# Patient Record
Sex: FEMALE | Race: WHITE | NOT HISPANIC OR LATINO | URBAN - METROPOLITAN AREA
[De-identification: names, ages, dates, MRNs, and addresses within clinical notes are randomized per-mention and may not be internally consistent; named-entity substitution may affect disease eponyms.]

---

## 2017-02-21 ENCOUNTER — EMERGENCY (EMERGENCY)
Facility: HOSPITAL | Age: 72
LOS: 1 days | Discharge: HOME | End: 2017-02-21
Attending: EMERGENCY MEDICINE
Payer: MEDICARE

## 2017-02-21 VITALS
RESPIRATION RATE: 20 BRPM | TEMPERATURE: 98 F | DIASTOLIC BLOOD PRESSURE: 92 MMHG | WEIGHT: 220.02 LBS | HEART RATE: 92 BPM | OXYGEN SATURATION: 100 % | HEIGHT: 68 IN | SYSTOLIC BLOOD PRESSURE: 142 MMHG

## 2017-02-21 VITALS
TEMPERATURE: 99 F | RESPIRATION RATE: 18 BRPM | HEART RATE: 95 BPM | DIASTOLIC BLOOD PRESSURE: 72 MMHG | OXYGEN SATURATION: 97 % | SYSTOLIC BLOOD PRESSURE: 130 MMHG

## 2017-02-21 DIAGNOSIS — R41.0 DISORIENTATION, UNSPECIFIED: ICD-10-CM

## 2017-02-21 DIAGNOSIS — R41.82 ALTERED MENTAL STATUS, UNSPECIFIED: ICD-10-CM

## 2017-02-21 LAB
ALBUMIN SERPL ELPH-MCNC: 3.7 G/DL — SIGNIFICANT CHANGE UP (ref 3.3–5.2)
ALP SERPL-CCNC: 73 U/L — SIGNIFICANT CHANGE UP (ref 40–120)
ALT FLD-CCNC: 7 U/L — SIGNIFICANT CHANGE UP
AMMONIA BLD-MCNC: 22 UMOL/L — SIGNIFICANT CHANGE UP (ref 11–55)
ANION GAP SERPL CALC-SCNC: 14 MMOL/L — SIGNIFICANT CHANGE UP (ref 5–17)
APPEARANCE UR: CLEAR — SIGNIFICANT CHANGE UP
AST SERPL-CCNC: 12 U/L — SIGNIFICANT CHANGE UP
BASOPHILS # BLD AUTO: 0 K/UL — SIGNIFICANT CHANGE UP (ref 0–0.2)
BASOPHILS NFR BLD AUTO: 0.4 % — SIGNIFICANT CHANGE UP (ref 0–2)
BILIRUB SERPL-MCNC: 0.7 MG/DL — SIGNIFICANT CHANGE UP (ref 0.4–2)
BILIRUB UR-MCNC: NEGATIVE — SIGNIFICANT CHANGE UP
BUN SERPL-MCNC: 17 MG/DL — SIGNIFICANT CHANGE UP (ref 8–20)
CALCIUM SERPL-MCNC: 9.1 MG/DL — SIGNIFICANT CHANGE UP (ref 8.6–10.2)
CHLORIDE SERPL-SCNC: 97 MMOL/L — LOW (ref 98–107)
CO2 SERPL-SCNC: 23 MMOL/L — SIGNIFICANT CHANGE UP (ref 22–29)
COLOR SPEC: SIGNIFICANT CHANGE UP
CREAT SERPL-MCNC: 0.98 MG/DL — SIGNIFICANT CHANGE UP (ref 0.5–1.3)
DIFF PNL FLD: ABNORMAL
EOSINOPHIL # BLD AUTO: 0.1 K/UL — SIGNIFICANT CHANGE UP (ref 0–0.5)
EOSINOPHIL NFR BLD AUTO: 1.3 % — SIGNIFICANT CHANGE UP (ref 0–6)
GLUCOSE SERPL-MCNC: 93 MG/DL — SIGNIFICANT CHANGE UP (ref 70–115)
GLUCOSE UR QL: NEGATIVE MG/DL — SIGNIFICANT CHANGE UP
HCT VFR BLD CALC: 39.3 % — SIGNIFICANT CHANGE UP (ref 37–47)
HGB BLD-MCNC: 12.9 G/DL — SIGNIFICANT CHANGE UP (ref 12–16)
KETONES UR-MCNC: NEGATIVE — SIGNIFICANT CHANGE UP
LACTATE BLDV-MCNC: 1.5 MMOL/L — SIGNIFICANT CHANGE UP (ref 0.5–1.6)
LEUKOCYTE ESTERASE UR-ACNC: ABNORMAL
LIDOCAIN IGE QN: 123 U/L — HIGH (ref 22–51)
LYMPHOCYTES # BLD AUTO: 1.7 K/UL — SIGNIFICANT CHANGE UP (ref 1–4.8)
LYMPHOCYTES # BLD AUTO: 36 % — SIGNIFICANT CHANGE UP (ref 20–55)
MAGNESIUM SERPL-MCNC: 2.4 MG/DL — SIGNIFICANT CHANGE UP (ref 1.8–2.5)
MCHC RBC-ENTMCNC: 28.4 PG — SIGNIFICANT CHANGE UP (ref 27–31)
MCHC RBC-ENTMCNC: 32.8 G/DL — SIGNIFICANT CHANGE UP (ref 32–36)
MCV RBC AUTO: 86.6 FL — SIGNIFICANT CHANGE UP (ref 81–99)
MONOCYTES # BLD AUTO: 0.4 K/UL — SIGNIFICANT CHANGE UP (ref 0–0.8)
MONOCYTES NFR BLD AUTO: 9.5 % — SIGNIFICANT CHANGE UP (ref 3–10)
NEUTROPHILS # BLD AUTO: 2.5 K/UL — SIGNIFICANT CHANGE UP (ref 1.8–8)
NEUTROPHILS NFR BLD AUTO: 52.6 % — SIGNIFICANT CHANGE UP (ref 37–73)
NITRITE UR-MCNC: NEGATIVE — SIGNIFICANT CHANGE UP
PH UR: 7 — SIGNIFICANT CHANGE UP (ref 4.8–8)
PHOSPHATE SERPL-MCNC: 2.7 MG/DL — SIGNIFICANT CHANGE UP (ref 2.4–4.7)
PLATELET # BLD AUTO: 220 K/UL — SIGNIFICANT CHANGE UP (ref 150–400)
POTASSIUM SERPL-MCNC: 4 MMOL/L — SIGNIFICANT CHANGE UP (ref 3.5–5.3)
POTASSIUM SERPL-SCNC: 4 MMOL/L — SIGNIFICANT CHANGE UP (ref 3.5–5.3)
PROT SERPL-MCNC: 7.4 G/DL — SIGNIFICANT CHANGE UP (ref 6.6–8.7)
PROT UR-MCNC: NEGATIVE MG/DL — SIGNIFICANT CHANGE UP
RBC # BLD: 4.54 M/UL — SIGNIFICANT CHANGE UP (ref 4.4–5.2)
RBC # FLD: 15.2 % — SIGNIFICANT CHANGE UP (ref 11–15.6)
SODIUM SERPL-SCNC: 134 MMOL/L — LOW (ref 135–145)
SP GR SPEC: 1 — LOW (ref 1.01–1.02)
UROBILINOGEN FLD QL: NEGATIVE MG/DL — SIGNIFICANT CHANGE UP
WBC # BLD: 4.7 K/UL — LOW (ref 4.8–10.8)
WBC # FLD AUTO: 4.7 K/UL — LOW (ref 4.8–10.8)

## 2017-02-21 PROCEDURE — 70450 CT HEAD/BRAIN W/O DYE: CPT | Mod: 26

## 2017-02-21 PROCEDURE — 93010 ELECTROCARDIOGRAM REPORT: CPT

## 2017-02-21 PROCEDURE — 99284 EMERGENCY DEPT VISIT MOD MDM: CPT

## 2017-02-21 RX ORDER — SODIUM CHLORIDE 9 MG/ML
1000 INJECTION INTRAMUSCULAR; INTRAVENOUS; SUBCUTANEOUS ONCE
Refills: 0 | Status: COMPLETED | OUTPATIENT
Start: 2017-02-21 | End: 2017-02-21

## 2017-02-21 RX ADMIN — SODIUM CHLORIDE 1000 MILLILITER(S): 9 INJECTION INTRAMUSCULAR; INTRAVENOUS; SUBCUTANEOUS at 20:25

## 2017-02-21 NOTE — ED ADULT NURSE REASSESSMENT NOTE - NS ED NURSE REASSESS COMMENT FT1
pt resting comfortably in bed with family at bedside.  IVF infusing without incident.  pt states she Is "feeling much better" and is ready to go home.  vital signs stable as per flowsheet.  will continue to monitor and reassess.

## 2017-02-21 NOTE — ED PROVIDER NOTE - NEUROLOGICAL, MLM
Alert and oriented, no focal deficits, no motor or sensory deficits.  Patient ambulatory with walker.

## 2017-02-21 NOTE — ED PROVIDER NOTE - CONSTITUTIONAL, MLM
normal... Well appearing, well nourished, awake, alert, oriented to person, place, time/situation and in no apparent distress but with confusion.

## 2017-02-21 NOTE — ED ADULT NURSE NOTE - CHIEF COMPLAINT QUOTE
ams per ems dispatch received a call from pt stating that she was being poisoned. pt arrives to er awake confused unable to obtain further info from pt due to confusion. ems states they spoke with pts son which stated pt has dementia and this is not new and the problem is she lives alone.

## 2017-02-21 NOTE — ED ADULT NURSE NOTE - OBJECTIVE STATEMENT
Assumed patient care at 1615.  Pt with Sister who states diagnosed with Klebsiella UTI 5 days ago.  Today Patient called 911 herself because she suddenly became dizzy.  Ambulates with a walker.  Pt is alert to person and place but not to time. She denies pain.  Sister states Pt has been showing signs of forgetfulness for the last three years.  She has becoming more disoriented over the last three weeks.  Pt ambulated with a walker and assistance to ER bathroom for urine sample.  Respirations even and unlabored.  Abdomen soft, nondistended. Assumed patient care at 1615. received in yellow gown.  Pt with Sister who states diagnosed with Klebsiella UTI 5 days ago.  Today Patient called 911 herself because she suddenly became dizzy.  Ambulates with a walker.  Pt is alert to person and place but not to time. She denies pain.  Sister states Pt has been showing signs of forgetfulness for the last three years.  She has becoming more disoriented over the last three weeks.  Pt ambulated with a walker and assistance to ER bathroom for urine sample.  Respirations even and unlabored.  Abdomen soft, nondistended.

## 2017-02-21 NOTE — ED PROVIDER NOTE - OBJECTIVE STATEMENT
This patient is a 72 year old woman with past medical history of anemia and dementia BIBA for dizziness. This patient is a 72 year old woman with past medical history of anemia and dementia BIBA with accusations of her being poisoned. This patient is a 72 year old woman with past medical history of anemia and dementia BIBA with accusations of her being poisoned.  Patient states that her brother who helps take care of her has been giving her medications.  Currently in the ER she has no complaints and cannot remember why she called the ambulance.  Patient's family arrived in the ER.  Her brother who is at bedside states that the patient reported dizziness and vision loss.  She is currently being treated for UTI.  1 week ago she had lab work that showed + UA with Klebsiella Pneumoniae.  She was started on Keflex.  Her brother has been giving her this new medication every day.  Brother and sister at bedside state that the patient has been getting better.  She told them that she felt dizzy and that is why she called the ambulance.  Family feel that patient is acting appropriately in the ER and at her baseline.

## 2017-02-24 LAB
-  AMPICILLIN: SIGNIFICANT CHANGE UP
-  NITROFURANTOIN: SIGNIFICANT CHANGE UP
-  TETRACYCLINE: SIGNIFICANT CHANGE UP
-  VANCOMYCIN: SIGNIFICANT CHANGE UP
CULTURE RESULTS: SIGNIFICANT CHANGE UP
METHOD TYPE: SIGNIFICANT CHANGE UP
ORGANISM # SPEC MICROSCOPIC CNT: SIGNIFICANT CHANGE UP
ORGANISM # SPEC MICROSCOPIC CNT: SIGNIFICANT CHANGE UP
SPECIMEN SOURCE: SIGNIFICANT CHANGE UP

## 2017-02-26 LAB
-  AMPICILLIN: SIGNIFICANT CHANGE UP
ORGANISM # SPEC MICROSCOPIC CNT: SIGNIFICANT CHANGE UP

## 2019-05-07 ENCOUNTER — TRANSCRIPTION ENCOUNTER (OUTPATIENT)
Age: 74
End: 2019-05-07

## 2019-05-08 NOTE — ED PROVIDER NOTE - EYES, MLM
Regular rate and rhythm, Heart sounds S1 S2 present, no murmurs, rubs or gallops
Clear bilaterally, pupils equal, round and reactive to light.

## 2019-07-15 ENCOUNTER — TRANSCRIPTION ENCOUNTER (OUTPATIENT)
Age: 74
End: 2019-07-15

## 2019-07-15 ENCOUNTER — INPATIENT (INPATIENT)
Facility: HOSPITAL | Age: 74
LOS: 2 days | Discharge: HOME | DRG: 69 | End: 2019-07-18
Attending: INTERNAL MEDICINE | Admitting: INTERNAL MEDICINE
Payer: MEDICARE

## 2019-07-15 VITALS
TEMPERATURE: 98 F | RESPIRATION RATE: 18 BRPM | DIASTOLIC BLOOD PRESSURE: 76 MMHG | SYSTOLIC BLOOD PRESSURE: 122 MMHG | OXYGEN SATURATION: 98 % | HEART RATE: 88 BPM

## 2019-07-15 DIAGNOSIS — R53.1 WEAKNESS: ICD-10-CM

## 2019-07-15 LAB
ALBUMIN SERPL ELPH-MCNC: 4 G/DL — SIGNIFICANT CHANGE UP (ref 3.3–5.2)
ALP SERPL-CCNC: 102 U/L — SIGNIFICANT CHANGE UP (ref 40–120)
ALT FLD-CCNC: 20 U/L — SIGNIFICANT CHANGE UP
ANION GAP SERPL CALC-SCNC: 11 MMOL/L — SIGNIFICANT CHANGE UP (ref 5–17)
APTT BLD: 27.3 SEC — LOW (ref 27.5–36.3)
AST SERPL-CCNC: 38 U/L — HIGH
BASOPHILS # BLD AUTO: 0.02 K/UL — SIGNIFICANT CHANGE UP (ref 0–0.2)
BASOPHILS NFR BLD AUTO: 0.3 % — SIGNIFICANT CHANGE UP (ref 0–2)
BILIRUB SERPL-MCNC: 0.6 MG/DL — SIGNIFICANT CHANGE UP (ref 0.4–2)
BUN SERPL-MCNC: 23 MG/DL — HIGH (ref 8–20)
CALCIUM SERPL-MCNC: 10 MG/DL — SIGNIFICANT CHANGE UP (ref 8.6–10.2)
CHLORIDE SERPL-SCNC: 94 MMOL/L — LOW (ref 98–107)
CO2 SERPL-SCNC: 22 MMOL/L — SIGNIFICANT CHANGE UP (ref 22–29)
CREAT SERPL-MCNC: 0.74 MG/DL — SIGNIFICANT CHANGE UP (ref 0.5–1.3)
EOSINOPHIL # BLD AUTO: 0.05 K/UL — SIGNIFICANT CHANGE UP (ref 0–0.5)
EOSINOPHIL NFR BLD AUTO: 0.7 % — SIGNIFICANT CHANGE UP (ref 0–6)
GLUCOSE SERPL-MCNC: 104 MG/DL — SIGNIFICANT CHANGE UP (ref 70–115)
HCT VFR BLD CALC: 36.8 % — SIGNIFICANT CHANGE UP (ref 34.5–45)
HGB BLD-MCNC: 12.1 G/DL — SIGNIFICANT CHANGE UP (ref 11.5–15.5)
IMM GRANULOCYTES NFR BLD AUTO: 0.5 % — SIGNIFICANT CHANGE UP (ref 0–1.5)
INR BLD: 0.98 RATIO — SIGNIFICANT CHANGE UP (ref 0.88–1.16)
LACTATE BLDV-MCNC: 1.3 MMOL/L — SIGNIFICANT CHANGE UP (ref 0.5–2)
LYMPHOCYTES # BLD AUTO: 1.12 K/UL — SIGNIFICANT CHANGE UP (ref 1–3.3)
LYMPHOCYTES # BLD AUTO: 15.2 % — SIGNIFICANT CHANGE UP (ref 13–44)
MCHC RBC-ENTMCNC: 31.1 PG — SIGNIFICANT CHANGE UP (ref 27–34)
MCHC RBC-ENTMCNC: 32.9 GM/DL — SIGNIFICANT CHANGE UP (ref 32–36)
MCV RBC AUTO: 94.6 FL — SIGNIFICANT CHANGE UP (ref 80–100)
MONOCYTES # BLD AUTO: 0.84 K/UL — SIGNIFICANT CHANGE UP (ref 0–0.9)
MONOCYTES NFR BLD AUTO: 11.4 % — SIGNIFICANT CHANGE UP (ref 2–14)
NEUTROPHILS # BLD AUTO: 5.29 K/UL — SIGNIFICANT CHANGE UP (ref 1.8–7.4)
NEUTROPHILS NFR BLD AUTO: 71.9 % — SIGNIFICANT CHANGE UP (ref 43–77)
PLATELET # BLD AUTO: 301 K/UL — SIGNIFICANT CHANGE UP (ref 150–400)
POTASSIUM SERPL-MCNC: 4.3 MMOL/L — SIGNIFICANT CHANGE UP (ref 3.5–5.3)
POTASSIUM SERPL-SCNC: 4.3 MMOL/L — SIGNIFICANT CHANGE UP (ref 3.5–5.3)
PROT SERPL-MCNC: 7.5 G/DL — SIGNIFICANT CHANGE UP (ref 6.6–8.7)
PROTHROM AB SERPL-ACNC: 11.3 SEC — SIGNIFICANT CHANGE UP (ref 10–12.9)
RBC # BLD: 3.89 M/UL — SIGNIFICANT CHANGE UP (ref 3.8–5.2)
RBC # FLD: 13.5 % — SIGNIFICANT CHANGE UP (ref 10.3–14.5)
SODIUM SERPL-SCNC: 127 MMOL/L — LOW (ref 135–145)
TROPONIN T SERPL-MCNC: <0.01 NG/ML — SIGNIFICANT CHANGE UP (ref 0–0.06)
WBC # BLD: 7.36 K/UL — SIGNIFICANT CHANGE UP (ref 3.8–10.5)
WBC # FLD AUTO: 7.36 K/UL — SIGNIFICANT CHANGE UP (ref 3.8–10.5)

## 2019-07-15 PROCEDURE — 99285 EMERGENCY DEPT VISIT HI MDM: CPT

## 2019-07-15 PROCEDURE — 71045 X-RAY EXAM CHEST 1 VIEW: CPT | Mod: 26

## 2019-07-15 PROCEDURE — 93010 ELECTROCARDIOGRAM REPORT: CPT

## 2019-07-15 PROCEDURE — 99223 1ST HOSP IP/OBS HIGH 75: CPT | Mod: AI

## 2019-07-15 PROCEDURE — 99222 1ST HOSP IP/OBS MODERATE 55: CPT

## 2019-07-15 RX ORDER — ATORVASTATIN CALCIUM 80 MG/1
40 TABLET, FILM COATED ORAL AT BEDTIME
Refills: 0 | Status: DISCONTINUED | OUTPATIENT
Start: 2019-07-15 | End: 2019-07-18

## 2019-07-15 RX ORDER — SODIUM CHLORIDE 9 MG/ML
1000 INJECTION INTRAMUSCULAR; INTRAVENOUS; SUBCUTANEOUS
Refills: 0 | Status: DISCONTINUED | OUTPATIENT
Start: 2019-07-15 | End: 2019-07-16

## 2019-07-15 RX ORDER — SODIUM CHLORIDE 9 MG/ML
1000 INJECTION INTRAMUSCULAR; INTRAVENOUS; SUBCUTANEOUS ONCE
Refills: 0 | Status: COMPLETED | OUTPATIENT
Start: 2019-07-15 | End: 2019-07-15

## 2019-07-15 RX ORDER — HEPARIN SODIUM 5000 [USP'U]/ML
5000 INJECTION INTRAVENOUS; SUBCUTANEOUS EVERY 8 HOURS
Refills: 0 | Status: DISCONTINUED | OUTPATIENT
Start: 2019-07-15 | End: 2019-07-18

## 2019-07-15 RX ORDER — HALOPERIDOL DECANOATE 100 MG/ML
2 INJECTION INTRAMUSCULAR EVERY 6 HOURS
Refills: 0 | Status: DISCONTINUED | OUTPATIENT
Start: 2019-07-15 | End: 2019-07-18

## 2019-07-15 RX ORDER — ACETAMINOPHEN 500 MG
650 TABLET ORAL EVERY 6 HOURS
Refills: 0 | Status: DISCONTINUED | OUTPATIENT
Start: 2019-07-15 | End: 2019-07-18

## 2019-07-15 RX ORDER — ASPIRIN/CALCIUM CARB/MAGNESIUM 324 MG
81 TABLET ORAL DAILY
Refills: 0 | Status: DISCONTINUED | OUTPATIENT
Start: 2019-07-15 | End: 2019-07-18

## 2019-07-15 RX ADMIN — ATORVASTATIN CALCIUM 40 MILLIGRAM(S): 80 TABLET, FILM COATED ORAL at 23:22

## 2019-07-15 RX ADMIN — Medication 81 MILLIGRAM(S): at 23:23

## 2019-07-15 RX ADMIN — SODIUM CHLORIDE 75 MILLILITER(S): 9 INJECTION INTRAMUSCULAR; INTRAVENOUS; SUBCUTANEOUS at 18:29

## 2019-07-15 RX ADMIN — HALOPERIDOL DECANOATE 2 MILLIGRAM(S): 100 INJECTION INTRAMUSCULAR at 23:22

## 2019-07-15 RX ADMIN — HEPARIN SODIUM 5000 UNIT(S): 5000 INJECTION INTRAVENOUS; SUBCUTANEOUS at 23:22

## 2019-07-15 RX ADMIN — SODIUM CHLORIDE 1000 MILLILITER(S): 9 INJECTION INTRAMUSCULAR; INTRAVENOUS; SUBCUTANEOUS at 15:07

## 2019-07-15 RX ADMIN — SODIUM CHLORIDE 1000 MILLILITER(S): 9 INJECTION INTRAMUSCULAR; INTRAVENOUS; SUBCUTANEOUS at 18:28

## 2019-07-15 NOTE — ED ADULT NURSE REASSESSMENT NOTE - NS ED NURSE REASSESS COMMENT FT1
Incontinence care performed, patient had regular bowel movement. Patient agitated during care, otherwise remains calm. Remains confused to baseline. IVF in progress, well tolerated. Will continue to monitor.

## 2019-07-15 NOTE — ED ADULT NURSE REASSESSMENT NOTE - NS ED NURSE REASSESS COMMENT FT1
Incontinence care provided. Patient becomes agitated & yells during care. Noted to have incontinence associated dermatitis.

## 2019-07-15 NOTE — H&P ADULT - HISTORY OF PRESENT ILLNESS
73 yo F w/ hx dementia, peripheral edema presents to ER for left sided weakness.  patient is a poor historian and dementia precludes relevant information to be obtained    per sister at bedside, patient was last normal 8 am and during breakfast began to tilt to right right with resulting difficulty in ambulating without assistance  +diarrhea x 2-3 weeks but resolved post course of antibiotics prescribed by PMD.  at baseline, patient ambulates with walker but requires ADL assistance.    evaluated in ER and deemed not candidate for TPA

## 2019-07-15 NOTE — ED PROVIDER NOTE - CLINICAL SUMMARY MEDICAL DECISION MAKING FREE TEXT BOX
patient presented with left sided weakness, found to have hypodensity of left temporal lobe, cta with aneurysm of ascending aorta will consult ct-surg, dr. mckinney consulted as neuro. admit to medicine and family requesting hospice consult. hcp desires patient to be dnr/dni.

## 2019-07-15 NOTE — ED ADULT NURSE REASSESSMENT NOTE - NS ED NURSE REASSESS COMMENT FT1
Assumed patient care at 1408, report received from previous RN, charting as noted. Patient awake, alert to self. Brother & sister at bedside stated patient has advanced dementia, denies any other medical Hx. Family has patients dentures with them. Cardiac monitor in place. Patient has saline lock in place, patent, negative s/s phlebitis or infiltration. Plan of care discussed in detail with brother, who is HCP. HCP expressed wishes for advanced directives, MD made aware. Will monitor.

## 2019-07-15 NOTE — ED PROVIDER NOTE - OBJECTIVE STATEMENT
74yoF; with pmh signif for Dementia, Peripheral Edema; now p/w left sided weakness. patient last seen normal at 9am.  aide states she woke up in her normal state of health at 8am. ambulated with walker without difficulty. went to sit down at breakfast table a little after 9am and leaned over to right sided. upon arrival to ED patient with slight drift of L UE. baseline confusion 2/2 dementia. patient with diarrhea x1 month--nonbloody. denies n/v. denies abd pain. denies cp/sob/palp.  denies dysuria, hematuria, frequency, urgency.  PMH: Dementia, Peripheral Edema  SOCIAL:  No tobacco/illicit substance use/EtOH

## 2019-07-15 NOTE — ED ADULT NURSE NOTE - INTERVENTIONS DEFINITIONS
Stretcher in lowest position, wheels locked, appropriate side rails in place/Provide visual cue, wrist band, yellow gown, etc.

## 2019-07-15 NOTE — H&P ADULT - ASSESSMENT
75 yo F w/ hx dementia, peripheral edema presents to ER for left sided weakness.    left sided weakness: r/o CVA, r/o UTI    repeat CT in am    aspirin/statin    lipid profile, tsh, hga1c     patient will not tolerate MRI (sister agrees)    hyponatremia: likely due to dehydration from diarrhea    IVF, trend      ascending aortic aneurysm on CT 4.4cm:    no intervention after d/w CT surgery and family.    dementia: at baseline.    diarrhea: check GI PCR    goals of care: DNR/DNI signed by ER and HCP son    family requesting hospice evaluation for home support and conservative management with patient.

## 2019-07-15 NOTE — ED ADULT TRIAGE NOTE - CHIEF COMPLAINT QUOTE
Pt brought in by ambulance from home for eval of generalized weakness since approx 9am. Pt with left sided lilting to the side. Code stroke called.

## 2019-07-15 NOTE — CONSULT NOTE ADULT - SUBJECTIVE AND OBJECTIVE BOX
74 year old female who is a poor historian, with a PMH of dementia & no other significant surgical history, information obtained from sister at the bedside.  Pt. admitted to ER for left sided weakness with right neglect, & difficulty in ambulating without assistance. Pt. has 24 hour nursing assistant who assists with ADL's. CT Angio Neck from 7/15/19 indicated aneurysmal ascending aorta measuring 4.4 cm, CTS notified for evaluation.      PAST MEDICAL & SURGICAL HISTORY:  Advanced dementia  No significant past surgical history      REVIEW OF SYSTEMS : Unable to obtain history, patient with dementia      General: No Weight change/ Fatigue/ HA/Dizzy	    Skin/Breast: No Rashes/ Lesions/ Masses  	  Ophthalmologic: No Blurry vision/ Glaucoma/ Blindness  	  ENT: No Hearing loss/ Drainage/ Lesions	    Respiratory and Thorax: No Cough/ Wheezing/ SOB/ Hemoptysis/ Sputum production  	  Cardiovascular: No Chest pain/ Palpitations/ Diaphoresis	    Gastrointestinal: No Nausea/ Vomiting/ Constipation/ Appetite Change	    Genitourinary: No Heamturia/ Dysuria/ Frequency change/ Impotence	    Musculoskeletal: No Pain/ Weakness/ Claudication	    Neurological: No Seizures/ TIA/CVA/ Parastesias	    Psychiatric: No Dementia/ Depression/ SI/HI	    Hematology/Lymphatics: No hx of bleeding/ Edema	    Endocrine:	No Hyperglycemia/ Hypoglycemia    Allergic/Immunologic:	 No Anaphylaxis/ Intolerance/ Recent illnesses    MEDICATIONS  (STANDING):  aspirin enteric coated 81 milliGRAM(s) Oral daily  atorvastatin 40 milliGRAM(s) Oral at bedtime  heparin  Injectable 5000 Unit(s) SubCutaneous every 8 hours  sodium chloride 0.9%. 1000 milliLiter(s) (75 mL/Hr) IV Continuous <Continuous>    MEDICATIONS  (PRN):  acetaminophen   Tablet .. 650 milliGRAM(s) Oral every 6 hours PRN Temp greater or equal to 38C (100.4F), Mild Pain (1 - 3), Moderate Pain (4 - 6)  haloperidol    Injectable 2 milliGRAM(s) IntraMuscular every 6 hours PRN Agitation      Allergies    No Known Allergies    Intolerances        SOCIAL HISTORY:    FAMILY HISTORY:  FH: hypertension      Vital Signs Last 24 Hrs  T(C): 36.9 (15 Jul 2019 12:59), Max: 36.9 (15 Jul 2019 12:30)  T(F): 98.4 (15 Jul 2019 12:59), Max: 98.4 (15 Jul 2019 12:30)  HR: 90 (15 Jul 2019 12:59) (88 - 90)  BP: 132/84 (15 Jul 2019 12:59) (122/76 - 132/84)  BP(mean): --  RR: 16 (15 Jul 2019 12:59) (16 - 18)  SpO2: 99% (15 Jul 2019 12:59) (98% - 99%)    General: WN/WD NAD  Neurology: Awake, nonfocal, FLORES spontaneously   Eyes: Scleras clear, PERRLA/ EOMI, Gross vision intact  ENT: Gross hearing intact, grossly patent pharynx, no stridor  Neck: Neck supple, trachea midline, No JVD,   Respiratory: CTA B/L, No wheezing, rales, rhonchi  CV: RRR, S1S2, no murmurs, rubs or gallops  Abdominal: Soft, NT, ND +BS,   Extremities: +3 edema to bilateral lower extremities, + peripheral pulses  Skin: Lypoma noted to left posterior back, No Rashes, Hematoma, Ecchymosis  Lymphatic: No Neck, axilla, groin LAD  Psych: Oriented to name      LABS:                        12.1   7.36  )-----------( 301      ( 15 Jul 2019 12:57 )             36.8     07-15    127<L>  |  94<L>  |  23.0<H>  ----------------------------<  104  4.3   |  22.0  |  0.74    Ca    10.0      15 Jul 2019 12:57    TPro  7.5  /  Alb  4.0  /  TBili  0.6  /  DBili  x   /  AST  38<H>  /  ALT  20  /  AlkPhos  102  07-15    PT/INR - ( 15 Jul 2019 12:57 )   PT: 11.3 sec;   INR: 0.98 ratio         PTT - ( 15 Jul 2019 12:57 )  PTT:27.3 sec      RADIOLOGY & ADDITIONAL STUDIES:  < from: CT Angio Neck w/ IV Cont (07.15.19 @ 13:31) >   EXAM:  CT ANGIO NECK (W)AW IC                         EXAM:  CT ANGIO BRAIN (W)AW IC                          PROCEDURE DATE:  07/15/2019          INTERPRETATION:  EXAMS:  1.  CT ANGIOGRAPHY NECK WITH INTRAVENOUS CONTRAST.  2.  CT ANGIOGRAPHY BRAIN WITH INTRAVENOUS CONTRAST.    CLINICAL HISTORY: left sided weakness. . .     TECHNIQUE: Contrast enhanced axial CT images were acquired from the   aortic arch to the vertex of the calvarium, during the angiographic   phase.  Three-dimensional maximum intensity projection reformats were   generated.  92 ml of Omnipaque-350 mg/ml were administered intravenously,   without immediate complication.    COMPARISON STUDY: CT head 7/15/2019    FINDINGS:     CT ANGIOGRAPHY NECK:     Thoracic aorta and branch vessels: Patent. Aneurysmal ascending aorta   measuring 4.4 cm.    Right carotid system: Patent.  No hemodynamically significant stenosis   using NASCET criteria.  No evidence of dissection.    Left carotid system: Patent.  No hemodynamically significant stenosis   using NASCET criteria.  No evidence of dissection.    Vertebral arteries: No focal stenosis or dissection.     Soft tissues of the neck: Unremarkable.    Visualized spine: Degenerative changes.  Visualized upper chest: No focal consolidation.    CT ANGIOGRAPHY BRAIN:    Internal carotid arteries: Patent.   Anterior cerebral arteries: Patent.   Middle cerebral arteries: Patent. Moderate narrowing of a proximal left   M2 branch (8:38, 7:67).  Posterior cerebral arteries: Patent.  Vertebrobasilar: Patent.  Branch vasculature of the posterior circulation   is within normal limits.     Vascular lesions: No evidence of intracranial aneurysm or large vascular   malformation, within limits of CT technique.    IMPRESSION:   CT angiography neck:   1.  No hemodynamically significant stenosis of the bilateral cervical   ICAs using NASCET criteria.  Patent vertebral arteries.  No evidence of   vascular dissection.  2.  Aneurysmal ascending aorta measuring 4.4 cm.  CT angiographybrain:   1.  No large vessel occlusion. Moderate narrowing of a proximal left M2   branch.  2.  No evidence of aneurysm.    [place]   This document has been electronically signed. Jul 15 2019  1:40PM    EXAM:  CT BRAIN STROKE PROTOCOL                          PROCEDURE DATE:  07/15/2019          INTERPRETATION:  CLINICAL INFORMATION: left sided weakness. . .    TECHNIQUE: Sequential axial images were obtained from the vertex to the   skull base without intravenous contrast. Coronal and sagittal   reformations were obtained.     COMPARISON: None .    FINDINGS:    Images are degraded by motion.    There is no acute intracranial hemorrhage or mass effect. There are areas   of hypodensity in the bilateral hemispheric white matter suggesting   chronic white matter microvascular ischemic change. There is an area of   hypodensity in the left posterior temporal white matter which may   represent asymmetric white matter changes versus acute infarct. There is   cerebral volume loss.    There is no extraaxial fluid collection.     There is no displaced calvarial fracture. The visualized orbits are   within normal limits. The visualized portions of the paranasal sinuses   are well aerated. The mastoid air cells are well aerated.    IMPRESSION: An area of hypodensity in the left posterior temporal white   matter which may represent asymmetric white matter changes versus acute   infarct.    These findings were discussed with Dr. CELESTE HALL [phone] at   7/15/2019 12:51 PM by Dr. [place] with read back confirmation.      SOU[place]O   This document has been electronically signed. Jul 15 2019 12:54PM    < end of copied text >                ASSESSMENT:   74yFemalePAST MEDICAL & SURGICAL HISTORY:  Advanced dementia  No significant past surgical history  HEALTH ISSUES - PROBLEM Dx:      HEALTH ISSUES - R/O PROBLEM Dx:   CT noted aneurysmal ascending aorta measuring 4.4 cm    PLAN:  Dr. Yost to review CT images, no surgical intervention at this time.   CTS to follow patient

## 2019-07-15 NOTE — ED PROVIDER NOTE - PHYSICAL EXAMINATION
Gen: Alert, NAD  Head: NC, AT, PERRL, EOMI, normal lids/conjunctiva  Neck: +supple, no tenderness/meningismus/JVD, +Trachea midline  Pulm: Bilateral BS, normal resp effort, no wheeze/stridor/retractions  CV: RRR, no M/R/G, +dist pulses  Abd: soft, NT/ND, +BS, no hepatosplenomegaly  Mskel: 2+ pedal edema bilaterally  Skin: no rash  Neuro: see below

## 2019-07-15 NOTE — ED PROVIDER NOTE - PROGRESS NOTE DETAILS
benefits of iv contrast outweigh risks at this time. Dr. Apple (Neurology) recommends against TPA given low NIHSS, rapidly improving symptoms, family/HCP agrees against giving TPA.

## 2019-07-16 PROBLEM — Z00.00 ENCOUNTER FOR PREVENTIVE HEALTH EXAMINATION: Status: ACTIVE | Noted: 2019-07-16

## 2019-07-16 LAB
ANION GAP SERPL CALC-SCNC: 9 MMOL/L — SIGNIFICANT CHANGE UP (ref 5–17)
APPEARANCE UR: CLEAR — SIGNIFICANT CHANGE UP
BACTERIA # UR AUTO: ABNORMAL
BILIRUB UR-MCNC: NEGATIVE — SIGNIFICANT CHANGE UP
BUN SERPL-MCNC: 14 MG/DL — SIGNIFICANT CHANGE UP (ref 8–20)
CALCIUM SERPL-MCNC: 9.3 MG/DL — SIGNIFICANT CHANGE UP (ref 8.6–10.2)
CHLORIDE SERPL-SCNC: 107 MMOL/L — SIGNIFICANT CHANGE UP (ref 98–107)
CHOLEST SERPL-MCNC: 147 MG/DL — SIGNIFICANT CHANGE UP (ref 110–199)
CO2 SERPL-SCNC: 21 MMOL/L — LOW (ref 22–29)
COLOR SPEC: YELLOW — SIGNIFICANT CHANGE UP
CREAT SERPL-MCNC: 0.62 MG/DL — SIGNIFICANT CHANGE UP (ref 0.5–1.3)
CULTURE RESULTS: SIGNIFICANT CHANGE UP
DIFF PNL FLD: ABNORMAL
EPI CELLS # UR: SIGNIFICANT CHANGE UP
GLUCOSE SERPL-MCNC: 91 MG/DL — SIGNIFICANT CHANGE UP (ref 70–115)
GLUCOSE UR QL: NEGATIVE MG/DL — SIGNIFICANT CHANGE UP
HBA1C BLD-MCNC: 4.8 % — SIGNIFICANT CHANGE UP (ref 4–5.6)
HCT VFR BLD CALC: 33.6 % — LOW (ref 34.5–45)
HDLC SERPL-MCNC: 56 MG/DL — SIGNIFICANT CHANGE UP
HGB BLD-MCNC: 10.5 G/DL — LOW (ref 11.5–15.5)
KETONES UR-MCNC: NEGATIVE — SIGNIFICANT CHANGE UP
LEUKOCYTE ESTERASE UR-ACNC: NEGATIVE — SIGNIFICANT CHANGE UP
LIPID PNL WITH DIRECT LDL SERPL: 81 MG/DL — SIGNIFICANT CHANGE UP
MCHC RBC-ENTMCNC: 30.6 PG — SIGNIFICANT CHANGE UP (ref 27–34)
MCHC RBC-ENTMCNC: 31.3 GM/DL — LOW (ref 32–36)
MCV RBC AUTO: 98 FL — SIGNIFICANT CHANGE UP (ref 80–100)
NITRITE UR-MCNC: POSITIVE
NT-PROBNP SERPL-SCNC: 240 PG/ML — SIGNIFICANT CHANGE UP (ref 0–300)
OSMOLALITY UR: 222 MOSM/KG — LOW (ref 300–1000)
PH UR: 6 — SIGNIFICANT CHANGE UP (ref 5–8)
PLATELET # BLD AUTO: 224 K/UL — SIGNIFICANT CHANGE UP (ref 150–400)
POTASSIUM SERPL-MCNC: 4 MMOL/L — SIGNIFICANT CHANGE UP (ref 3.5–5.3)
POTASSIUM SERPL-SCNC: 4 MMOL/L — SIGNIFICANT CHANGE UP (ref 3.5–5.3)
PROT UR-MCNC: NEGATIVE MG/DL — SIGNIFICANT CHANGE UP
RBC # BLD: 3.43 M/UL — LOW (ref 3.8–5.2)
RBC # FLD: 13.8 % — SIGNIFICANT CHANGE UP (ref 10.3–14.5)
RBC CASTS # UR COMP ASSIST: SIGNIFICANT CHANGE UP /HPF (ref 0–4)
SODIUM SERPL-SCNC: 137 MMOL/L — SIGNIFICANT CHANGE UP (ref 135–145)
SODIUM UR-SCNC: 38 MMOL/L — SIGNIFICANT CHANGE UP
SP GR SPEC: 1.01 — SIGNIFICANT CHANGE UP (ref 1.01–1.02)
SPECIMEN SOURCE: SIGNIFICANT CHANGE UP
TOTAL CHOLESTEROL/HDL RATIO MEASUREMENT: 3 RATIO — LOW (ref 3.3–7.1)
TRIGL SERPL-MCNC: 50 MG/DL — SIGNIFICANT CHANGE UP (ref 10–200)
TSH SERPL-MCNC: 2.63 UIU/ML — SIGNIFICANT CHANGE UP (ref 0.27–4.2)
UROBILINOGEN FLD QL: NEGATIVE MG/DL — SIGNIFICANT CHANGE UP
WBC # BLD: 2.94 K/UL — LOW (ref 3.8–10.5)
WBC # FLD AUTO: 2.94 K/UL — LOW (ref 3.8–10.5)
WBC UR QL: NEGATIVE — SIGNIFICANT CHANGE UP

## 2019-07-16 PROCEDURE — 99232 SBSQ HOSP IP/OBS MODERATE 35: CPT

## 2019-07-16 PROCEDURE — 70450 CT HEAD/BRAIN W/O DYE: CPT | Mod: 26

## 2019-07-16 RX ORDER — NYSTATIN CREAM 100000 [USP'U]/G
1 CREAM TOPICAL
Refills: 0 | Status: DISCONTINUED | OUTPATIENT
Start: 2019-07-16 | End: 2019-07-18

## 2019-07-16 RX ADMIN — HEPARIN SODIUM 5000 UNIT(S): 5000 INJECTION INTRAVENOUS; SUBCUTANEOUS at 15:43

## 2019-07-16 RX ADMIN — HEPARIN SODIUM 5000 UNIT(S): 5000 INJECTION INTRAVENOUS; SUBCUTANEOUS at 05:41

## 2019-07-16 RX ADMIN — Medication 81 MILLIGRAM(S): at 11:56

## 2019-07-16 RX ADMIN — ATORVASTATIN CALCIUM 40 MILLIGRAM(S): 80 TABLET, FILM COATED ORAL at 22:24

## 2019-07-16 RX ADMIN — NYSTATIN CREAM 1 APPLICATION(S): 100000 CREAM TOPICAL at 17:35

## 2019-07-16 RX ADMIN — HEPARIN SODIUM 5000 UNIT(S): 5000 INJECTION INTRAVENOUS; SUBCUTANEOUS at 22:24

## 2019-07-16 NOTE — GOALS OF CARE CONVERSATION - PERSONAL ADVANCE DIRECTIVE - CONVERSATION DETAILS
[facility] RN met with patient, patient's brother Warren and patient's sister Mona at patient bedside. Family requesting home hospice services through [facility]. All aspects of home hospice service explained, including RN, SW, chaplaincy, DME and medication. As per family, patient already has 24/7 private HHA in place through Medicaid. Patient will require a hospital bed with gel mattress to be delivered to home; order for same placed this afternoon. All questions answered; emotional support provided. Consent forms for hospice signed. Will continue to follow.     Ember Ferrara, AMINATA  [phone]

## 2019-07-16 NOTE — DISCHARGE NOTE NURSING/CASE MANAGEMENT/SOCIAL WORK - NSDCDPATPORTLINK_GEN_ALL_CORE
You can access the [facility], offered by [facility], by registering with the following website: [email or web address]

## 2019-07-16 NOTE — PHYSICAL THERAPY INITIAL EVALUATION ADULT - IMPAIRMENTS FOUND, PT EVAL
aerobic capacity/endurance/neuromotor development and sensory integration/muscle strength/gait, locomotion, and balance/cognitive impairment

## 2019-07-16 NOTE — PHYSICAL THERAPY INITIAL EVALUATION ADULT - CRITERIA FOR SKILLED THERAPEUTIC INTERVENTIONS
risk reduction/prevention/rehab potential/therapy frequency/anticipated discharge recommendation/impairments found/functional limitations in following categories

## 2019-07-16 NOTE — DISCHARGE NOTE NURSING/CASE MANAGEMENT/SOCIAL WORK - NSDCPEPTSTRK_GEN_ALL_CORE
Call 911 for stroke/Need for follow up after discharge/Risk factors for stroke/Stroke support groups for patients, families, and friends/Prescribed medications/Stroke education booklet/Stroke warning signs and symptoms/Signs and symptoms of stroke

## 2019-07-16 NOTE — PHYSICAL THERAPY INITIAL EVALUATION ADULT - ADDITIONAL COMMENTS
as per RN, and CCC pt. lives with Family 24 hr supervision, ambulatory prior to admission, due to dementia pt. unable to provide information

## 2019-07-17 LAB
ANION GAP SERPL CALC-SCNC: 11 MMOL/L — SIGNIFICANT CHANGE UP (ref 5–17)
BUN SERPL-MCNC: 14 MG/DL — SIGNIFICANT CHANGE UP (ref 8–20)
CALCIUM SERPL-MCNC: 9.1 MG/DL — SIGNIFICANT CHANGE UP (ref 8.6–10.2)
CHLORIDE SERPL-SCNC: 106 MMOL/L — SIGNIFICANT CHANGE UP (ref 98–107)
CO2 SERPL-SCNC: 23 MMOL/L — SIGNIFICANT CHANGE UP (ref 22–29)
CREAT SERPL-MCNC: 0.58 MG/DL — SIGNIFICANT CHANGE UP (ref 0.5–1.3)
GLUCOSE SERPL-MCNC: 100 MG/DL — SIGNIFICANT CHANGE UP (ref 70–115)
HCT VFR BLD CALC: 36.6 % — SIGNIFICANT CHANGE UP (ref 34.5–45)
HCV AB S/CO SERPL IA: 0.09 S/CO — SIGNIFICANT CHANGE UP (ref 0–0.99)
HCV AB SERPL-IMP: SIGNIFICANT CHANGE UP
HGB BLD-MCNC: 11.7 G/DL — SIGNIFICANT CHANGE UP (ref 11.5–15.5)
MCHC RBC-ENTMCNC: 30.3 PG — SIGNIFICANT CHANGE UP (ref 27–34)
MCHC RBC-ENTMCNC: 32 GM/DL — SIGNIFICANT CHANGE UP (ref 32–36)
MCV RBC AUTO: 94.8 FL — SIGNIFICANT CHANGE UP (ref 80–100)
PLATELET # BLD AUTO: 297 K/UL — SIGNIFICANT CHANGE UP (ref 150–400)
POTASSIUM SERPL-MCNC: 3.7 MMOL/L — SIGNIFICANT CHANGE UP (ref 3.5–5.3)
POTASSIUM SERPL-SCNC: 3.7 MMOL/L — SIGNIFICANT CHANGE UP (ref 3.5–5.3)
RBC # BLD: 3.86 M/UL — SIGNIFICANT CHANGE UP (ref 3.8–5.2)
RBC # FLD: 13.9 % — SIGNIFICANT CHANGE UP (ref 10.3–14.5)
SODIUM SERPL-SCNC: 140 MMOL/L — SIGNIFICANT CHANGE UP (ref 135–145)
WBC # BLD: 3.47 K/UL — LOW (ref 3.8–10.5)
WBC # FLD AUTO: 3.47 K/UL — LOW (ref 3.8–10.5)

## 2019-07-17 PROCEDURE — 99222 1ST HOSP IP/OBS MODERATE 55: CPT

## 2019-07-17 PROCEDURE — 99232 SBSQ HOSP IP/OBS MODERATE 35: CPT

## 2019-07-17 RX ORDER — CEFTRIAXONE 500 MG/1
INJECTION, POWDER, FOR SOLUTION INTRAMUSCULAR; INTRAVENOUS
Refills: 0 | Status: DISCONTINUED | OUTPATIENT
Start: 2019-07-17 | End: 2019-07-18

## 2019-07-17 RX ORDER — CEFTRIAXONE 500 MG/1
1000 INJECTION, POWDER, FOR SOLUTION INTRAMUSCULAR; INTRAVENOUS EVERY 24 HOURS
Refills: 0 | Status: DISCONTINUED | OUTPATIENT
Start: 2019-07-18 | End: 2019-07-18

## 2019-07-17 RX ORDER — CEFTRIAXONE 500 MG/1
1000 INJECTION, POWDER, FOR SOLUTION INTRAMUSCULAR; INTRAVENOUS ONCE
Refills: 0 | Status: COMPLETED | OUTPATIENT
Start: 2019-07-17 | End: 2019-07-17

## 2019-07-17 RX ADMIN — CEFTRIAXONE 100 MILLIGRAM(S): 500 INJECTION, POWDER, FOR SOLUTION INTRAMUSCULAR; INTRAVENOUS at 14:25

## 2019-07-17 RX ADMIN — Medication 81 MILLIGRAM(S): at 11:42

## 2019-07-17 RX ADMIN — ATORVASTATIN CALCIUM 40 MILLIGRAM(S): 80 TABLET, FILM COATED ORAL at 22:48

## 2019-07-17 RX ADMIN — HEPARIN SODIUM 5000 UNIT(S): 5000 INJECTION INTRAVENOUS; SUBCUTANEOUS at 06:11

## 2019-07-17 RX ADMIN — HEPARIN SODIUM 5000 UNIT(S): 5000 INJECTION INTRAVENOUS; SUBCUTANEOUS at 22:48

## 2019-07-17 RX ADMIN — NYSTATIN CREAM 1 APPLICATION(S): 100000 CREAM TOPICAL at 06:11

## 2019-07-17 RX ADMIN — NYSTATIN CREAM 1 APPLICATION(S): 100000 CREAM TOPICAL at 17:52

## 2019-07-17 RX ADMIN — HEPARIN SODIUM 5000 UNIT(S): 5000 INJECTION INTRAVENOUS; SUBCUTANEOUS at 13:15

## 2019-07-17 NOTE — CONSULT NOTE ADULT - SUBJECTIVE AND OBJECTIVE BOX
[facility]  INFECTIOUS DISEASES AND INTERNAL MEDICINE at [place]  =======================================================  Demond Smith MD  Diplomates American Board of Internal Medicine and Infectious Diseases  Telephone [phone]  Fax            [phone]  =======================================================    [facility]-[id number]  YESSI KWAN   HPI:  73 yo F w/ hx dementia, peripheral edema presents to ER for left sided weakness.  patient is a poor historian and dementia precludes relevant information to be obtained    per sister at bedside, patient was last normal 8 am and during breakfast began to tilt to right right with resulting difficulty in ambulating without assistance  +diarrhea x 2-3 weeks but resolved post course of antibiotics prescribed by PMD.  at baseline, patient ambulates with walker but requires ADL assistance.    evaluated in ER and deemed not candidate for TPA.    During workup in hospital. patient found with positive UA for Nitrite and  negative Leukocyte esterase, moderate bacteremia    Urine culture now with more than 100K CFU gram negative rods.     patient denies pain.  cannot provide any additional history.     =======================================================  Past Medical & Surgical Hx:  =====================  PAST MEDICAL & SURGICAL HISTORY:  Advanced dementia  No significant past surgical history    Problem List:  ==========  HEALTH ISSUES - PROBLEM Dx:    Social Hx:  =======  no toxic habits currently    FAMILY HISTORY:  FH: hypertension  no significant family history of immunosuppressive disorders in mother or father   =======================================================  REVIEW OF SYSTEMS:  as above  all other ROS negative  =======================================================  Allergies  No Known Allergies    Antibiotics:  cefTRIAXone   IVPB        Other medications:  aspirin enteric coated 81 milliGRAM(s) Oral daily  atorvastatin 40 milliGRAM(s) Oral at bedtime  heparin  Injectable 5000 Unit(s) SubCutaneous every 8 hours  nystatin Powder 1 Application(s) Topical two times a day     cefTRIAXone   IVPB   100 mL/Hr IV Intermittent (19 @ 14:25)    ======================================================  Physical Exam:  ============  T(F): 97.9 (2019 08:00), Max: 99.3 (2019 18:54)  HR: 81 (2019 08:00)  BP: 125/77 (2019 08:00)  RR: 18 (2019 08:00)  SpO2: 97% (2019 08:00) (96% - 100%)  temp max in last 48H T(F): , Max: 99.3 (19 @ 18:54)    General:  No acute distress.  Eye: Pupils are equal, round and reactive to light, Extraocular movements are intact, Normal conjunctiva.  HENT: Normocephalic, Oral mucosa is moist, No pharyngeal erythema, No sinus tenderness.  Neck: Supple, No lymphadenopathy.  Respiratory: Lungs are clear to auscultation, Respirations are non-labored.  Cardiovascular: Normal rate, Regular rhythm,   Gastrointestinal: Soft, Non-tender, Non-distended, Normal bowel sounds.  Genitourinary: No costovertebral angle tenderness. no suprapubic tenderness  Lymphatics: No lymphadenopathy neck,   Musculoskeletal: Normal range of motion, Normal strength.  Integumentary: No rash.  Neurologic: awake, Cranial Nerves II-XII are grossly intact.  Psychiatric: Appropriate      =======================================================  Labs:                        11.7   3.47  )-----------( 297      ( 2019 08:23 )             36.6       WBC Count: 3.47 K/uL (19 @ 08:23)  WBC Count: 2.94 K/uL (19 @ 05:38)  WBC Count: 7.36 K/uL (07-15-19 @ 12:57)          140  |  106  |  14.0  ----------------------------<  100  3.7   |  23.0  |  0.58    Ca    9.1      2019 08:23      GI PCR Panel, Stool (collected 19 @ 18:11)  Source: .Stool  Final Report (19 @ 21:26):    Enteropathogenic E. coli (EPEC)    DETECTED by PCR    *******Please Note:*******    GI panel PCR evaluates for:    Campylobacter, Plesiomonas shigelloides, Salmonella,    Vibrio, Yersinia enterocolitica, Enteroaggregative    Escherichia coli (EAEC), Enteropathogenic E.coli (EPEC),    Enterotoxigenic E. coli (ETEC) lt/st, Shiga-like    toxin-producing E. coli (STEC) stx1/stx2,    Shigella/ Enteroinvasive E. coli (EIEC), Cryptosporidium,    Cyclospora cayetanensis, Entamoeba histolytica,    Giardia lamblia, AdenovirusF 40/41, Astrovirus,    Norovirus GI/GII, Rotavirus A, Sapovirus    Culture - Urine (collected 19 @ 02:45)  Source: .Urine    Creatinine, Serum: 0.58 mg/dL (19 @ 08:23)  Creatinine, Serum: 0.62 mg/dL (19 @ 05:38)  Creatinine, Serum: 0.74 mg/dL (07-15-19 @ 12:57)    Urinalysis Basic - ( 2019 02:44 )    Color: Yellow / Appearance: Clear / S.010 / pH: x  Gluc: x / Ketone: Negative  / Bili: Negative / Urobili: Negative mg/dL   Blood: x / Protein: Negative mg/dL / Nitrite: Positive   Leuk Esterase: Negative / RBC: 0-2 /HPF / WBC Negative   Sq Epi: x / Non Sq Epi: Occasional / Bacteria: Moderate

## 2019-07-17 NOTE — CONSULT NOTE ADULT - ASSESSMENT
This 75 yo F w/ hx dementia, peripheral edema presents to ER for left sided weakness.  patient is a poor historian and dementia.    found with positive urine culture  likely UTI    - suggest to continue Ceftriaxone 1 gram Q 24H  - follow up all outstanding cultures  - trend temperature and WBC curve  - repeat cultures from blood and all sources if febrile.

## 2019-07-18 ENCOUNTER — TRANSCRIPTION ENCOUNTER (OUTPATIENT)
Age: 74
End: 2019-07-18

## 2019-07-18 VITALS
RESPIRATION RATE: 18 BRPM | DIASTOLIC BLOOD PRESSURE: 76 MMHG | SYSTOLIC BLOOD PRESSURE: 144 MMHG | TEMPERATURE: 98 F | OXYGEN SATURATION: 99 % | HEART RATE: 84 BPM

## 2019-07-18 PROCEDURE — 99232 SBSQ HOSP IP/OBS MODERATE 35: CPT

## 2019-07-18 PROCEDURE — 99239 HOSP IP/OBS DSCHRG MGMT >30: CPT

## 2019-07-18 RX ORDER — ASPIRIN/CALCIUM CARB/MAGNESIUM 324 MG
1 TABLET ORAL
Qty: 30 | Refills: 0
Start: 2019-07-18 | End: 2019-08-16

## 2019-07-18 RX ORDER — CEFDINIR 250 MG/5ML
1 POWDER, FOR SUSPENSION ORAL
Qty: 6 | Refills: 0
Start: 2019-07-18 | End: 2019-07-20

## 2019-07-18 RX ORDER — ATORVASTATIN CALCIUM 80 MG/1
1 TABLET, FILM COATED ORAL
Qty: 30 | Refills: 0
Start: 2019-07-18 | End: 2019-08-16

## 2019-07-18 RX ADMIN — CEFTRIAXONE 100 MILLIGRAM(S): 500 INJECTION, POWDER, FOR SOLUTION INTRAMUSCULAR; INTRAVENOUS at 12:47

## 2019-07-18 RX ADMIN — Medication 81 MILLIGRAM(S): at 12:47

## 2019-07-18 RX ADMIN — NYSTATIN CREAM 1 APPLICATION(S): 100000 CREAM TOPICAL at 05:57

## 2019-07-18 RX ADMIN — HEPARIN SODIUM 5000 UNIT(S): 5000 INJECTION INTRAVENOUS; SUBCUTANEOUS at 05:57

## 2019-07-18 NOTE — PROGRESS NOTE ADULT - SUBJECTIVE AND OBJECTIVE BOX
seen for dementia, weakness    no events/complaints  pleasantly confused  ros unable to obtain due to mental status    MEDICATIONS  (STANDING):  aspirin enteric coated 81 milliGRAM(s) Oral daily  atorvastatin 40 milliGRAM(s) Oral at bedtime  cefTRIAXone   IVPB      heparin  Injectable 5000 Unit(s) SubCutaneous every 8 hours  nystatin Powder 1 Application(s) Topical two times a day    MEDICATIONS  (PRN):  acetaminophen   Tablet .. 650 milliGRAM(s) Oral every 6 hours PRN Temp greater or equal to 38C (100.4F), Mild Pain (1 - 3), Moderate Pain (4 - 6)  haloperidol    Injectable 2 milliGRAM(s) IntraMuscular every 6 hours PRN Agitation      Allergies    No Known Allergies    Vital Signs Last 24 Hrs  T(C): 36.6 (2019 08:00), Max: 37.4 (2019 18:54)  T(F): 97.9 (2019 08:00), Max: 99.3 (2019 18:54)  HR: 81 (2019 08:00) (81 - 99)  BP: 125/77 (2019 08:00) (123/70 - 149/77)  BP(mean): --  RR: 18 (2019 08:00) (16 - 20)  SpO2: 97% (2019 08:00) (96% - 100%)    PHYSICAL EXAM:    GENERAL: NAD  CHEST/LUNG: Clear to percussion bilaterally  HEART: Regular rate and rhythm; S1 S2  ABDOMEN: Soft,; Bowel sounds present  EXTREMITIES: trace ankle edema  NERVOUS SYSTEM: awake, alert, confused, no gross neuro deficits    LABS:                        11.7   3.47  )-----------( 297      ( 2019 08:23 )             36.6     07-17    140  |  106  |  14.0  ----------------------------<  100  3.7   |  23.0  |  0.58    Ca    9.1      2019 08:23        Urinalysis Basic - ( 2019 02:44 )    Color: Yellow / Appearance: Clear / S.010 / pH: x  Gluc: x / Ketone: Negative  / Bili: Negative / Urobili: Negative mg/dL   Blood: x / Protein: Negative mg/dL / Nitrite: Positive   Leuk Esterase: Negative / RBC: 0-2 /HPF / WBC Negative   Sq Epi: x / Non Sq Epi: Occasional / Bacteria: Moderate        CAPILLARY BLOOD GLUCOSE            RADIOLOGY & ADDITIONAL TESTS:
[facility]  INFECTIOUS DISEASES AND INTERNAL MEDICINE at [place]  =======================================================  Demond Smith MD  Diplomates American Board of Internal Medicine and Infectious Diseases  Telephone [phone]  Fax            [phone]  =======================================================    [id number]  [facility]   follow up for:  UTI  patient seen and examined.     no new complaints  ===================================================  REVIEW OF SYSTEMS:  as above  all other ROS negative  =======================================================  Allergies    No Known Allergies    Intolerances    Antibiotics:  cefTRIAXone   IVPB      cefTRIAXone   IVPB 1000 milliGRAM(s) IV Intermittent every 24 hours    Other medications:  aspirin enteric coated 81 milliGRAM(s) Oral daily  atorvastatin 40 milliGRAM(s) Oral at bedtime  heparin  Injectable 5000 Unit(s) SubCutaneous every 8 hours  nystatin Powder 1 Application(s) Topical two times a day    ======================================================  Physical Exam:  ============  T(F): 98.7 (18 Jul 2019 05:21), Max: 98.8 (18 Jul 2019 00:36)  HR: 82 (18 Jul 2019 05:21)  BP: 112/68 (18 Jul 2019 05:21)  RR: 18 (18 Jul 2019 05:21)  SpO2: 97% (18 Jul 2019 05:21) (97% - 100%)  temp max in last 48H T(F): , Max: 99.3 (07-16-19 @ 18:54)    General:  No acute distress.  Eye: Pupils are equal, round and reactive to light, Extraocular movements are intact, Normal conjunctiva.  HENT: Normocephalic, Oral mucosa is moist, No pharyngeal erythema, No sinus tenderness.  Neck: Supple, No lymphadenopathy.  Respiratory: Lungs are clear to auscultation, Respirations are non-labored.  Cardiovascular: Normal rate, Regular rhythm,   Gastrointestinal: Soft, Non-tender, Non-distended, Normal bowel sounds.  Genitourinary: No costovertebral angle tenderness. no suprapubic tenderness  Lymphatics: No lymphadenopathy neck,   Musculoskeletal: Normal range of motion, Normal strength.  Integumentary: No rash.  Neurologic: awake, Cranial Nerves II-XII are grossly intact.  Psychiatric: Appropriate      =======================================================  Labs:                        11.7   3.47  )-----------( 297      ( 17 Jul 2019 08:23 )             36.6       WBC Count: 3.47 K/uL (07-17-19 @ 08:23)  WBC Count: 2.94 K/uL (07-16-19 @ 05:38)  WBC Count: 7.36 K/uL (07-15-19 @ 12:57)      07-17    140  |  106  |  14.0  ----------------------------<  100  3.7   |  23.0  |  0.58    Ca    9.1      17 Jul 2019 08:23        GI PCR Panel, Stool (collected 07-16-19 @ 18:11)  Source: .Stool  Final Report (07-16-19 @ 21:26):    Enteropathogenic E. coli (EPEC)    DETECTED by PCR    *******Please Note:*******    GI panel PCR evaluates for:    Campylobacter, Plesiomonas shigelloides, Salmonella,    Vibrio, Yersinia enterocolitica, Enteroaggregative    Escherichia coli (EAEC), Enteropathogenic E.coli (EPEC),    Enterotoxigenic E. coli (ETEC) lt/st, Shiga-like    toxin-producing E. coli (STEC) stx1/stx2,    Shigella/ Enteroinvasive E. coli (EIEC), Cryptosporidium,    Cyclospora cayetanensis, Entamoeba histolytica,    Giardia lamblia, AdenovirusF 40/41, Astrovirus,    Norovirus GI/GII, Rotavirus A, Sapovirus    Culture - Urine (collected 07-16-19 @ 02:45)  Source: .Urine  Final Report (07-18-19 @ 09:07):    >100,000 CFU/ml Escherichia coli  Organism: Escherichia coli (07-18-19 @ 09:07)  Organism: Escherichia coli (07-18-19 @ 09:07)    Sensitivities:      -  Amikacin: S <=16      -  Ampicillin: R >16 These ampicillin results predict results for amoxicillin      -  Ampicillin/Sulbactam: R >16/8 Enterobacter, Citrobacter, and Serratia may develop resistance during prolonged therapy (3-4 days)      -  Aztreonam: S <=4      -  Cefazolin: S <=8 (MIC_CL_COM_ENTERIC_CEFAZU) For uncomplicated UTI with K. pneumoniae, E. coli, or P. mirablis: TAMIKA <=16 is sensitive and TAMIKA >=32 is resistant. This also predicts results for oral agents cefaclor, cefdinir, cefpodoxime, cefprozil, cefuroxime axetil, cephalexin and locarbef for uncomplicated UTI. Note that some isolates may be susceptible to these agents while testing resistant to cefazolin.      -  Cefepime: S <=4      -  Cefoxitin: S <=8      -  Ceftriaxone: S <=1 Enterobacter, Citrobacter, and Serratia may develop resistance during prolonged therapy      -  Ciprofloxacin: S <=1      -  Ertapenem: S <=1      -  Gentamicin: S <=4      -  Imipenem: S <=1      -  Levofloxacin: S <=2      -  Meropenem: S <=1      -  Nitrofurantoin: S <=32 Should not be used to treat pyelonephritis      -  Piperacillin/Tazobactam: S <=16      -  Tigecycline: S <=2      -  Tobramycin: S <=4      -  Trimethoprim/Sulfamethoxazole: R >2/38      Method Type: TAMIKA      Creatinine, Serum: 0.58 mg/dL (07-17-19 @ 08:23)  Creatinine, Serum: 0.62 mg/dL (07-16-19 @ 05:38)  Creatinine, Serum: 0.74 mg/dL (07-15-19 @ 12:57)
seen for weakness    no acute complaints/events  ros limited due to dementia  tolerated diet well  in chair with PT.    MEDICATIONS  (STANDING):  aspirin enteric coated 81 milliGRAM(s) Oral daily  atorvastatin 40 milliGRAM(s) Oral at bedtime  heparin  Injectable 5000 Unit(s) SubCutaneous every 8 hours  sodium chloride 0.9%. 1000 milliLiter(s) (75 mL/Hr) IV Continuous <Continuous>    MEDICATIONS  (PRN):  acetaminophen   Tablet .. 650 milliGRAM(s) Oral every 6 hours PRN Temp greater or equal to 38C (100.4F), Mild Pain (1 - 3), Moderate Pain (4 - 6)  haloperidol    Injectable 2 milliGRAM(s) IntraMuscular every 6 hours PRN Agitation      Allergies    No Known Allergies      Vital Signs Last 24 Hrs  T(C): 36.6 (2019 08:48), Max: 37.2 (15 Jul 2019 21:27)  T(F): 97.9 (2019 08:48), Max: 99 (15 Jul 2019 21:27)  HR: 75 (2019 08:32) (61 - 106)  BP: 127/79 (2019 08:32) (113/70 - 132/84)  BP(mean): 93 (2019 08:32) (82 - 97)  RR: 18 (2019 08:32) (14 - 18)  SpO2: 100% (2019 08:32) (96% - 100%)    PHYSICAL EXAM:    GENERAL: NAD  CHEST/LUNG: Clear to percussion bilaterally;  HEART: Regular rate and rhythm; S1 S2  ABDOMEN: Soft,  Bowel sounds present  EXTREMITIES:  +1 edema   NERVOUS SYSTEM: alert/awake, confused, oriented to self. moves all extremities spontaneously. no notable weakness. gen weakness  LABS:                        10.5   2.94  )-----------( 224      ( 2019 05:38 )             33.6     07-16    137  |  107  |  14.0  ----------------------------<  91  4.0   |  21.0<L>  |  0.62    Ca    9.3      2019 05:38    TPro  7.5  /  Alb  4.0  /  TBili  0.6  /  DBili  x   /  AST  38<H>  /  ALT  20  /  AlkPhos  102  07-15    PT/INR - ( 15 Jul 2019 12:57 )   PT: 11.3 sec;   INR: 0.98 ratio         PTT - ( 15 Jul 2019 12:57 )  PTT:27.3 sec  Urinalysis Basic - ( 2019 02:44 )    Color: Yellow / Appearance: Clear / S.010 / pH: x  Gluc: x / Ketone: Negative  / Bili: Negative / Urobili: Negative mg/dL   Blood: x / Protein: Negative mg/dL / Nitrite: Positive   Leuk Esterase: Negative / RBC: 0-2 /HPF / WBC Negative   Sq Epi: x / Non Sq Epi: Occasional / Bacteria: Moderate        CAPILLARY BLOOD GLUCOSE      POCT Blood Glucose.: 106 mg/dL (15 Jul 2019 12:48)        RADIOLOGY & ADDITIONAL TESTS:

## 2019-07-18 NOTE — DISCHARGE NOTE PROVIDER - NSDCCPCAREPLAN_GEN_ALL_CORE_FT
PRINCIPAL DISCHARGE DIAGNOSIS  Diagnosis: TIA (transient ischemic attack)  Assessment and Plan of Treatment: suspected  continue aspirin and lipitor  follow up with primary care doctor      SECONDARY DISCHARGE DIAGNOSES  Diagnosis: Hyponatremia  Assessment and Plan of Treatment: resolved after IV fluids      Diagnosis: E-coli UTI  Assessment and Plan of Treatment: finish course of antibiotics for 3 more days    Diagnosis: Intestinal infection due to enteropathogenic E. coli  Assessment and Plan of Treatment: diarrhea resolved    Diagnosis: Advanced dementia  Assessment and Plan of Treatment: home hospice

## 2019-07-18 NOTE — DISCHARGE NOTE PROVIDER - NSDCADMDATE_GEN_ALL_CORE_FT
Patient wanting to change ocp's.She is having irregular bleeding x 2 months. Please advise.  
See note  
Spoke w/ pt. Having irregular bleeding with micronor. Pt asking to try a different pill or proceed with hysterectomy. Recommend trying more medical management due to medical risks for surgery (BMI of 45 and type 2 diabetes). Even though there are risks of combination ocps, they are less than the risks associated with surgery. Reviewed risks of elevated BP, DVT,PE and stroke. rx loestrin 24.  
15-Jul-2019 14:36

## 2019-07-18 NOTE — DISCHARGE NOTE PROVIDER - HOSPITAL COURSE
75 yo F w/ hx dementia, peripheral edema presents to ER for left sided weakness.    negative CT x 2 for acute stroke, possible TIA or metabolic induced    history of diarrhea which resolved, GI PCR with enteropathogenic ECOLI.  no treatment as diarrhea resolved.  hyponatremia on admission, resolved with IV fluids.    Urinalysis benign but urine culture with E Coli, Infectious disease recommending 3 more days of cefdonir to complete 5 day course.  Seen by CT surgery for ascending aortic aneurysm, no intervention warranted.         seen by hospice per family request and home hospice accepted.        dc to home hospice.  dc planning 35 minutes.    updated Warren over the phone.

## 2019-07-18 NOTE — PROGRESS NOTE ADULT - ASSESSMENT
75 yo F w/ hx dementia, peripheral edema presents to ER for left sided weakness.    left sided weakness: resolved.    TIA/CVA vs metabolic disturbance    repeat CT negative for stroke    aspirin/statin    patient will not tolerate MRI (sister agrees)    + urine culture: suspect UTI.  3 days of ceftriaxone.    hyponatremia: likely due to dehydration from diarrhea    resolved post IVF       ascending aortic aneurysm on CT 4.4cm:    no intervention after d/w CT surgery and family.    dementia: at baseline.    diarrhea: check GI PCR   resolved, formed stool today    goals of care: DNR/DNI signed by ER and HCP son    family requesting hospice evaluation for home support/equipment and conservative management with patient.    dc planning 24-48 hrs
73 yo F w/ hx dementia, peripheral edema presents to ER for left sided weakness.    left sided weakness: resolved.    infectious source ruled out    TIA/CVA vs metabolic disturbance    repeat CT today    aspirin/statin    patient will not tolerate MRI (sister agrees)    hyponatremia: likely due to dehydration from diarrhea    resolved post IVF       ascending aortic aneurysm on CT 4.4cm:    no intervention after d/w CT surgery and family.    dementia: at baseline.    diarrhea: check GI PCR   resolved, formed stool today    goals of care: DNR/DNI signed by ER and HCP son    family requesting hospice evaluation for home support/equipment and conservative management with patient.    updated son over the phone in detail.
This 75 yo F w/ hx dementia, peripheral edema presents to ER for left sided weakness.  patient is a poor historian and dementia.    found with positive urine culture; E COLI UTI    - on Ceftriaxone 1 gram Q 24H  CAN CHANGE TO VANTIN 200MG PO BID, OR CEFDINIR 300MG PO BID   TO COMPLETE 3 MORE DAYS AT HOME STARTING TOMORROW 7/19      will sign off

## 2019-09-29 PROCEDURE — 70498 CT ANGIOGRAPHY NECK: CPT

## 2019-09-29 PROCEDURE — 87507 IADNA-DNA/RNA PROBE TQ 12-25: CPT

## 2019-09-29 PROCEDURE — 85027 COMPLETE CBC AUTOMATED: CPT

## 2019-09-29 PROCEDURE — 81001 URINALYSIS AUTO W/SCOPE: CPT

## 2019-09-29 PROCEDURE — 85610 PROTHROMBIN TIME: CPT

## 2019-09-29 PROCEDURE — 84443 ASSAY THYROID STIM HORMONE: CPT

## 2019-09-29 PROCEDURE — 83036 HEMOGLOBIN GLYCOSYLATED A1C: CPT

## 2019-09-29 PROCEDURE — 70496 CT ANGIOGRAPHY HEAD: CPT

## 2019-09-29 PROCEDURE — 84300 ASSAY OF URINE SODIUM: CPT

## 2019-09-29 PROCEDURE — 36415 COLL VENOUS BLD VENIPUNCTURE: CPT

## 2019-09-29 PROCEDURE — 86803 HEPATITIS C AB TEST: CPT

## 2019-09-29 PROCEDURE — 97116 GAIT TRAINING THERAPY: CPT

## 2019-09-29 PROCEDURE — 82962 GLUCOSE BLOOD TEST: CPT

## 2019-09-29 PROCEDURE — 93005 ELECTROCARDIOGRAM TRACING: CPT

## 2019-09-29 PROCEDURE — 83605 ASSAY OF LACTIC ACID: CPT

## 2019-09-29 PROCEDURE — 70450 CT HEAD/BRAIN W/O DYE: CPT

## 2019-09-29 PROCEDURE — 83880 ASSAY OF NATRIURETIC PEPTIDE: CPT

## 2019-09-29 PROCEDURE — 84484 ASSAY OF TROPONIN QUANT: CPT

## 2019-09-29 PROCEDURE — 80061 LIPID PANEL: CPT

## 2019-09-29 PROCEDURE — 71045 X-RAY EXAM CHEST 1 VIEW: CPT

## 2019-09-29 PROCEDURE — 83935 ASSAY OF URINE OSMOLALITY: CPT

## 2019-09-29 PROCEDURE — 87086 URINE CULTURE/COLONY COUNT: CPT

## 2019-09-29 PROCEDURE — 80048 BASIC METABOLIC PNL TOTAL CA: CPT

## 2019-09-29 PROCEDURE — 85730 THROMBOPLASTIN TIME PARTIAL: CPT

## 2019-09-29 PROCEDURE — 99285 EMERGENCY DEPT VISIT HI MDM: CPT | Mod: 25

## 2019-09-29 PROCEDURE — 87186 SC STD MICRODIL/AGAR DIL: CPT

## 2019-09-29 PROCEDURE — 97163 PT EVAL HIGH COMPLEX 45 MIN: CPT

## 2019-09-29 PROCEDURE — 97110 THERAPEUTIC EXERCISES: CPT

## 2019-09-29 PROCEDURE — 80053 COMPREHEN METABOLIC PANEL: CPT

## 2020-11-12 ENCOUNTER — INPATIENT (INPATIENT)
Facility: HOSPITAL | Age: 75
LOS: 4 days | Discharge: HOSPICE | DRG: 481 | End: 2020-11-17
Attending: INTERNAL MEDICINE | Admitting: STUDENT IN AN ORGANIZED HEALTH CARE EDUCATION/TRAINING PROGRAM
Payer: MEDICARE

## 2020-11-12 ENCOUNTER — TRANSCRIPTION ENCOUNTER (OUTPATIENT)
Age: 75
End: 2020-11-12

## 2020-11-12 VITALS
WEIGHT: 149.91 LBS | SYSTOLIC BLOOD PRESSURE: 124 MMHG | OXYGEN SATURATION: 99 % | HEART RATE: 75 BPM | DIASTOLIC BLOOD PRESSURE: 76 MMHG | HEIGHT: 60.1 IN | RESPIRATION RATE: 20 BRPM

## 2020-11-12 DIAGNOSIS — S72.001A FRACTURE OF UNSPECIFIED PART OF NECK OF RIGHT FEMUR, INITIAL ENCOUNTER FOR CLOSED FRACTURE: ICD-10-CM

## 2020-11-12 PROBLEM — D64.9 ANEMIA, UNSPECIFIED: Chronic | Status: ACTIVE | Noted: 2017-02-21

## 2020-11-12 PROBLEM — F03.90 UNSPECIFIED DEMENTIA WITHOUT BEHAVIORAL DISTURBANCE: Chronic | Status: ACTIVE | Noted: 2019-07-15

## 2020-11-12 LAB
ABO RH CONFIRMATION: SIGNIFICANT CHANGE UP
ALBUMIN SERPL ELPH-MCNC: 3.5 G/DL — SIGNIFICANT CHANGE UP (ref 3.3–5.2)
ALP SERPL-CCNC: 64 U/L — SIGNIFICANT CHANGE UP (ref 40–120)
ALT FLD-CCNC: 14 U/L — SIGNIFICANT CHANGE UP
ANION GAP SERPL CALC-SCNC: 12 MMOL/L — SIGNIFICANT CHANGE UP (ref 5–17)
APTT BLD: 25.6 SEC — LOW (ref 27.5–35.5)
AST SERPL-CCNC: 22 U/L — SIGNIFICANT CHANGE UP
BASOPHILS # BLD AUTO: 0.03 K/UL — SIGNIFICANT CHANGE UP (ref 0–0.2)
BASOPHILS NFR BLD AUTO: 0.5 % — SIGNIFICANT CHANGE UP (ref 0–2)
BILIRUB SERPL-MCNC: 0.7 MG/DL — SIGNIFICANT CHANGE UP (ref 0.4–2)
BLD GP AB SCN SERPL QL: SIGNIFICANT CHANGE UP
BUN SERPL-MCNC: 21 MG/DL — HIGH (ref 8–20)
CALCIUM SERPL-MCNC: 8.7 MG/DL — SIGNIFICANT CHANGE UP (ref 8.6–10.2)
CHLORIDE SERPL-SCNC: 87 MMOL/L — LOW (ref 98–107)
CO2 SERPL-SCNC: 22 MMOL/L — SIGNIFICANT CHANGE UP (ref 22–29)
CREAT SERPL-MCNC: 0.65 MG/DL — SIGNIFICANT CHANGE UP (ref 0.5–1.3)
EOSINOPHIL # BLD AUTO: 0.05 K/UL — SIGNIFICANT CHANGE UP (ref 0–0.5)
EOSINOPHIL NFR BLD AUTO: 0.8 % — SIGNIFICANT CHANGE UP (ref 0–6)
GLUCOSE SERPL-MCNC: 110 MG/DL — HIGH (ref 70–99)
HCT VFR BLD CALC: 24.4 % — LOW (ref 34.5–45)
HGB BLD-MCNC: 8.3 G/DL — LOW (ref 11.5–15.5)
IMM GRANULOCYTES NFR BLD AUTO: 0.6 % — SIGNIFICANT CHANGE UP (ref 0–1.5)
INR BLD: 1.03 RATIO — SIGNIFICANT CHANGE UP (ref 0.88–1.16)
LYMPHOCYTES # BLD AUTO: 1.43 K/UL — SIGNIFICANT CHANGE UP (ref 1–3.3)
LYMPHOCYTES # BLD AUTO: 22.6 % — SIGNIFICANT CHANGE UP (ref 13–44)
MCHC RBC-ENTMCNC: 31.9 PG — SIGNIFICANT CHANGE UP (ref 27–34)
MCHC RBC-ENTMCNC: 34 GM/DL — SIGNIFICANT CHANGE UP (ref 32–36)
MCV RBC AUTO: 93.8 FL — SIGNIFICANT CHANGE UP (ref 80–100)
MONOCYTES # BLD AUTO: 0.71 K/UL — SIGNIFICANT CHANGE UP (ref 0–0.9)
MONOCYTES NFR BLD AUTO: 11.2 % — SIGNIFICANT CHANGE UP (ref 2–14)
NEUTROPHILS # BLD AUTO: 4.07 K/UL — SIGNIFICANT CHANGE UP (ref 1.8–7.4)
NEUTROPHILS NFR BLD AUTO: 64.3 % — SIGNIFICANT CHANGE UP (ref 43–77)
PLATELET # BLD AUTO: 227 K/UL — SIGNIFICANT CHANGE UP (ref 150–400)
POTASSIUM SERPL-MCNC: 4.9 MMOL/L — SIGNIFICANT CHANGE UP (ref 3.5–5.3)
POTASSIUM SERPL-SCNC: 4.9 MMOL/L — SIGNIFICANT CHANGE UP (ref 3.5–5.3)
PROT SERPL-MCNC: 6.3 G/DL — LOW (ref 6.6–8.7)
PROTHROM AB SERPL-ACNC: 11.9 SEC — SIGNIFICANT CHANGE UP (ref 10.6–13.6)
RBC # BLD: 2.6 M/UL — LOW (ref 3.8–5.2)
RBC # FLD: 13.4 % — SIGNIFICANT CHANGE UP (ref 10.3–14.5)
SARS-COV-2 RNA SPEC QL NAA+PROBE: SIGNIFICANT CHANGE UP
SODIUM SERPL-SCNC: 121 MMOL/L — LOW (ref 135–145)
WBC # BLD: 6.33 K/UL — SIGNIFICANT CHANGE UP (ref 3.8–10.5)
WBC # FLD AUTO: 6.33 K/UL — SIGNIFICANT CHANGE UP (ref 3.8–10.5)

## 2020-11-12 PROCEDURE — 72192 CT PELVIS W/O DYE: CPT | Mod: 26

## 2020-11-12 PROCEDURE — 99222 1ST HOSP IP/OBS MODERATE 55: CPT | Mod: 57

## 2020-11-12 PROCEDURE — 73560 X-RAY EXAM OF KNEE 1 OR 2: CPT | Mod: 26,RT

## 2020-11-12 PROCEDURE — 99285 EMERGENCY DEPT VISIT HI MDM: CPT | Mod: CS

## 2020-11-12 PROCEDURE — 73552 X-RAY EXAM OF FEMUR 2/>: CPT | Mod: 26,RT

## 2020-11-12 PROCEDURE — 71045 X-RAY EXAM CHEST 1 VIEW: CPT | Mod: 26

## 2020-11-12 PROCEDURE — 93971 EXTREMITY STUDY: CPT | Mod: 26,RT

## 2020-11-12 PROCEDURE — 70450 CT HEAD/BRAIN W/O DYE: CPT | Mod: 26

## 2020-11-12 PROCEDURE — 99223 1ST HOSP IP/OBS HIGH 75: CPT

## 2020-11-12 PROCEDURE — 73502 X-RAY EXAM HIP UNI 2-3 VIEWS: CPT | Mod: 26,RT

## 2020-11-12 PROCEDURE — 72125 CT NECK SPINE W/O DYE: CPT | Mod: 26

## 2020-11-12 RX ORDER — CEFAZOLIN SODIUM 1 G
2000 VIAL (EA) INJECTION ONCE
Refills: 0 | Status: DISCONTINUED | OUTPATIENT
Start: 2020-11-13 | End: 2020-11-13

## 2020-11-12 RX ORDER — MORPHINE SULFATE 50 MG/1
2 CAPSULE, EXTENDED RELEASE ORAL EVERY 4 HOURS
Refills: 0 | Status: DISCONTINUED | OUTPATIENT
Start: 2020-11-12 | End: 2020-11-13

## 2020-11-12 RX ORDER — ACETAMINOPHEN 500 MG
650 TABLET ORAL EVERY 6 HOURS
Refills: 0 | Status: DISCONTINUED | OUTPATIENT
Start: 2020-11-12 | End: 2020-11-13

## 2020-11-12 RX ORDER — FOLIC ACID 0.8 MG
0 TABLET ORAL
Qty: 0 | Refills: 0 | DISCHARGE

## 2020-11-12 RX ORDER — CEPHALEXIN 500 MG
0 CAPSULE ORAL
Qty: 0 | Refills: 0 | DISCHARGE

## 2020-11-12 RX ORDER — SODIUM CHLORIDE 9 MG/ML
1000 INJECTION INTRAMUSCULAR; INTRAVENOUS; SUBCUTANEOUS
Refills: 0 | Status: DISCONTINUED | OUTPATIENT
Start: 2020-11-12 | End: 2020-11-13

## 2020-11-12 RX ORDER — ENOXAPARIN SODIUM 100 MG/ML
40 INJECTION SUBCUTANEOUS DAILY
Refills: 0 | Status: DISCONTINUED | OUTPATIENT
Start: 2020-11-12 | End: 2020-11-12

## 2020-11-12 RX ORDER — MORPHINE SULFATE 50 MG/1
2 CAPSULE, EXTENDED RELEASE ORAL ONCE
Refills: 0 | Status: DISCONTINUED | OUTPATIENT
Start: 2020-11-12 | End: 2020-11-12

## 2020-11-12 RX ORDER — SODIUM CHLORIDE 9 MG/ML
500 INJECTION INTRAMUSCULAR; INTRAVENOUS; SUBCUTANEOUS ONCE
Refills: 0 | Status: COMPLETED | OUTPATIENT
Start: 2020-11-12 | End: 2020-11-12

## 2020-11-12 RX ORDER — FERROUS SULFATE 325(65) MG
0 TABLET ORAL
Qty: 0 | Refills: 0 | DISCHARGE

## 2020-11-12 RX ADMIN — MORPHINE SULFATE 2 MILLIGRAM(S): 50 CAPSULE, EXTENDED RELEASE ORAL at 14:14

## 2020-11-12 RX ADMIN — SODIUM CHLORIDE 500 MILLILITER(S): 9 INJECTION INTRAMUSCULAR; INTRAVENOUS; SUBCUTANEOUS at 17:14

## 2020-11-12 RX ADMIN — Medication 650 MILLIGRAM(S): at 23:00

## 2020-11-12 RX ADMIN — Medication 650 MILLIGRAM(S): at 23:39

## 2020-11-12 RX ADMIN — SODIUM CHLORIDE 75 MILLILITER(S): 9 INJECTION INTRAMUSCULAR; INTRAVENOUS; SUBCUTANEOUS at 18:47

## 2020-11-12 RX ADMIN — MORPHINE SULFATE 2 MILLIGRAM(S): 50 CAPSULE, EXTENDED RELEASE ORAL at 13:43

## 2020-11-12 NOTE — H&P ADULT - NSHPLABSRESULTS_GEN_ALL_CORE
CBC Full  -  ( 12 Nov 2020 13:45 )  WBC Count : 6.33 K/uL  RBC Count : 2.60 M/uL  Hemoglobin : 8.3 g/dL  Hematocrit : 24.4 %  Platelet Count - Automated : 227 K/uL  Mean Cell Volume : 93.8 fl  Mean Cell Hemoglobin : 31.9 pg  Mean Cell Hemoglobin Concentration : 34.0 gm/dL  Auto Neutrophil # : 4.07 K/uL  Auto Lymphocyte # : 1.43 K/uL  Auto Monocyte # : 0.71 K/uL  Auto Eosinophil # : 0.05 K/uL  Auto Basophil # : 0.03 K/uL  Auto Neutrophil % : 64.3 %  Auto Lymphocyte % : 22.6 %  Auto Monocyte % : 11.2 %  Auto Eosinophil % : 0.8 %  Auto Basophil % : 0.5 %      11-12    121<L>  |  87<L>  |  21.0<H>  ----------------------------<  110<H>  4.9   |  22.0  |  0.65    Ca    8.7      12 Nov 2020 13:45    TPro  6.3<L>  /  Alb  3.5  /  TBili  0.7  /  DBili  x   /  AST  22  /  ALT  14  /  AlkPhos  64  11-12

## 2020-11-12 NOTE — PROGRESS NOTE ADULT - SUBJECTIVE AND OBJECTIVE BOX
Anesthesia pre op  76 yo female for subtrochanteric femoral nailing  chart reviewed  h/o syncope, advanced dementia, anemia, hld  severe hyponatremia which should be corrected prior to surgery  npo post midnight  Balta Link MD

## 2020-11-12 NOTE — ED PROVIDER NOTE - PROGRESS NOTE DETAILS
updated family regarding hyponatremia and anemia. ct angio ordered to evaluate vasculature.  spoke to medicine to expedite clearance and will admit.

## 2020-11-12 NOTE — ED ADULT NURSE NOTE - OBJECTIVE STATEMENT
Assumed pt care at 1250.  Pt a&ox1 at baseline, family states she fell while getting out into wheelchair, pt states she is in pain, pt unable to provide further information at this time, will continue to monitor

## 2020-11-12 NOTE — ED PROVIDER NOTE - OBJECTIVE STATEMENT
75yoF; with PMH signif for Dementia and Anemia; now p/w right hip pain s/p fall from wheelchair 5 days ago.  family states this was a witnessed fall by HHA.  2 days PTA, patient went to 75yoF; with PMH signif for Dementia and Anemia; now p/w right hip pain s/p fall from wheelchair 5 days ago.  family states this was a witnessed fall by HHA.  2 days PTA, patient went to outpatient radiology and was found to have right comminuted proximal femur fracture.  patient denies n/v. denies headache.  denies cp or abd pain.  PMH: Dementia, Anemia  SOCIAL: no substance use

## 2020-11-12 NOTE — H&P ADULT - NSHPPHYSICALEXAM_GEN_ALL_CORE
General: elderly family laying in bed, awake,  mumbles  Head: normocephalic   Cardio: regular rate and rhythm   Pulm: clear breath sounds   GI: abdomen soft   Extr: right LE swollen, slightly inverted to right, decrease range of motion   Neuro: Awake, not oriented, does not follow any commands.

## 2020-11-12 NOTE — ED PROVIDER NOTE - PHYSICAL EXAMINATION
Gen: Alert, NAD  Head: NC, AT, PERRL, EOMI, normal lids/conjunctiva  ENT: B TM WNL  Neck: +supple, no tenderness/meningismus/JVD, +Trachea midline  Pulm: Bilateral BS, normal resp effort, no wheeze/stridor/retractions  CV: RRR, no M/R/G, +dist pulses  Abd: soft, NT/ND, +BS, no hepatosplenomegaly  Mskel:    L UE: from/nt @shoulder/elbow/wrist/hand   R UE: from/nt @shoulder/elbow/wrist/hand   L LE: from/nt @ hip/knee/ankle. 1+ edema   R LE: from/nt @knee/ankle. ttp @ anterior hip, ttp @ mid femur.  2+3edmea at ankle   distal pulses intact  Skin: abrasion to right hip  Neuro: grossly intact

## 2020-11-12 NOTE — ED PROVIDER NOTE - CARE PLAN
Principal Discharge DX:	Closed fracture of right hip, initial encounter  Secondary Diagnosis:	Hyponatremia  Secondary Diagnosis:	Anemia

## 2020-11-12 NOTE — ED PROVIDER NOTE - CLINICAL SUMMARY MEDICAL DECISION MAKING FREE TEXT BOX
patient s/p fall 5 days ago, found to have right hip fracture. also found to have hyponatremia and anemia.  will admit to medicine for clearance prior to operative repair.

## 2020-11-12 NOTE — ED ADULT NURSE NOTE - NS ED NOTE  TALK SOMEONE YN
SUBJECTIVE:  History of Present Illness:  Alonso Taylor is a 42 year old year old male patient of Dr. Garcia presenting today for a concern r/t STI exposure.    Patient did have ER visit 6/21/18 for STD concern, ER report states patient was concerned about possible chlamydia exposure.  He did not have any symptoms of infection per ER report, he just wanted to get checked.  Urine testing for chlamydia and gonorrhea was negative at the time.      Patient reports he was not sexually active for 3 years, as of one week ago he was sexually active and did use a condom, he learned that he may have been exposed to chlamydia.  Patient reports he feels his recently tested sample was less accurate than it could be as he did void earlier in the day, he has not had any symptoms other then an increase in urinary dribbling at the end of urination, reports he is still concerned he may have been exposed and he would like to consider retesting. He denies any history of STI, fevers, chills, malaise, history of bladder problems or kidney stones, dysuria, hematuria, frequency or urgency, follow odor to urine, cloudy urine, penile discharge, testicular or scrotal pain, abdominal or pelvic pains.      ALLERGIES:   Allergen Reactions   • Ibuprofen HIVES   • Tuberculin RASH       No current outpatient prescriptions on file.     No current facility-administered medications for this visit.        History reviewed. No pertinent past medical history.    History reviewed. No pertinent surgical history.    Family History   Problem Relation Age of Onset   • Diabetes Mother    • High blood pressure Mother    • Cancer Father    • Cancer Brother    • High blood pressure Paternal Grandmother      OBJECTIVE:  Physical Exam:  Visit Vitals  BP (!) 158/104   Pulse 60   Ht 5' 8\" (1.727 m)   Wt 68.5 kg   BMI 22.96 kg/m²     General:  Alert, pleasant male in no acute distress.  Skin:  Warm, pink, and dry.  Eyes:  PERRLA.  Sclera white.    HENT:  Head:  Normocephalic, atraumatic.   Cardiovascular:  Regular rate, normal S1, S2.  No murmur, gallop, or rub.    Respiratory:  Lungs clear to auscultation.  No wheezes, rhonchi, or crackles.  GI:  Abdomen soft, non tender. No masses.  No hepatosplenomegaly.   :  Bladder non-palpable.  Normal male genitalia.  Testicles smooth and nontender.  No masses.  No skin lesions.  No inguinal lymphadenopathy or hernia.   Neurologic:  Alert, oriented X3.  Cranial nerves II-XII grossly intact.  DTR's 2+ and grossly intact throughout.  Psychologic: Alert, good eye contact, thoughts connected.  Dress and appearance appropriate.    LABS: CGPT urine, urinalysis with reflex micro/culture.    ASSESSMENT & PLAN:  1.  Possible STD exposure, urinary dribbling at end of urination: We discussed testing methods including urethral swab, first urine of the day. Patient would prefer first urine of the day and he is given supplies to take home and return this to clinic for testing. We discussed screening for other STI's, patient declines. Patient will be notified of results when available.    2.  Health maintenance: Recommend patient schedule CPE at some time in near future with Dr. Garcia or another provider at clinic. Patient due for tetanus, risks and benefits discussed, patient consents and this is administered.    3. Elevated blood pressure without diagnosis of hypertension: Patient reports blood pressure may be elevated as he has been very anxious about possible STD exposure, has not had routine blood pressure measurements for a couple of years now, history of blood pressures appear fairly well controlled with occasional elevations. Recommend low-sodium diet. Have patient back in 1 week for nurse visit for BP recheck.    Follow-up as needed and as previously scheduled.    Orders Placed This Encounter   • TETANUS DIPHTHERIA ACELLULAR PERTUSSIS VACC, 10+ YRS (BOOSTRIX)   • URINALYSIS REFLEX MICRO WITH CULTURE IF INDICATED   • Chlamydia/GC by  Nucleic Acid Amplification         Mily Yañez, APNP     No

## 2020-11-12 NOTE — CONSULT NOTE ADULT - ATTENDING COMMENTS
Orthopaedic Trauma Surgeon Addendum:    I have personally performed a face-to-face diagnostic evaluation on this patient.  I have reviewed the physician assistant note and agree with the history, exam, and plan of care, except as noted.    Orthopedic Surgery is ready to proceed with surgery pending medical optimization and adequate Operating Room availability. Risks of surgical delay discussed with other providers and staff. Please call with any questions or concerns.     Jorge L Noriega MD  Orthopaedic Trauma Surgeon  [facility] [place]

## 2020-11-12 NOTE — H&P ADULT - HISTORY OF PRESENT ILLNESS
76 yo female from home, has 24 hr HHA, wheelchair bound, non verbal with PMHs of severe dementia brought for right hip fracture. Last Sunday she fell from chair, did not appear to be in pain. Two days later aid noted swollen right LE with inversion. Brother took her with ambulance to get XRAY and was told that she has a hip fracture. Brother called orthopedist - Dr. Granado who recommended to have surgery.   Per brother and sister at bedside, they want her to have surgery to have some quality if life, be pain free " until she dies"   She needs 100 % assist with ADL, including feeding. 76 yo female from home, has 24 hr HHA, wheelchair bound, non verbal with PMHs of severe dementia brought for right hip fracture. Last Sunday she fell from chair, did not appear to be in pain. Two days later aid noted swollen right LE with inversion. Brother took her with ambulance to get XRAY and was told that she has a hip fracture. Brother called orthopedist - Dr. Granado who recommended to have surgery.   Per brother and sister at bedside, they want her to have surgery to have some quality of life, be pain free " until she dies"   She needs 100 % assist with ADL, including feeding.

## 2020-11-12 NOTE — CONSULT NOTE ADULT - SUBJECTIVE AND OBJECTIVE BOX
Pt Name: YESSI MARIN    MRN: [id number]      Patient is a 75y Female presenting to the emergency department with right hip pain s/p fall on sunday. Patient has dementia and is a poor historian. Patient is unable to answer or participate in exam. Warren Marin, brother, was at bedside and answered questions and gave history. Dr. Noriega has been in contact with Warren prior to patient coming to ED. Patient lives with assistance. Patient was using wheelchair prior to fracture. On Sunday patient had a witnessed fall from wheelchair but did not show any signs or symptoms of fracture. On Tuesday, Warren noticed swelling and decided to get xrays at [facility] in [place] that showed a displaced right proximal femur fracture. Patient arrived today after discussion with Dr. Noriega that surgery is best option.       REVIEW OF SYSTEMS  General: Alert, responsive, in NAD  Skin/Breast: No rashes, no pruritis 	  Respiratory and Thorax: No difficulty breathing. No cough.	   Cardiovascular:	No chest pain. No palpitations.  Musculoskeletal: SEE HPI.  Neurological: No sensory or motor changes.   ROS is otherwise negative.    PAST MEDICAL & SURGICAL HISTORY:  Advanced dementia  Anemia      No significant past surgical history      Allergies: No Known Allergies      Medications:     FAMILY HISTORY:  FH: hypertension      Social History: non-contributory                          8.3    6.33  )-----------( 227      ( 12 Nov 2020 13:45 )             24.4       11-12    121<L>  |  87<L>  |  21.0<H>  ----------------------------<  110<H>  4.9   |  22.0  |  0.65    Ca    8.7      12 Nov 2020 13:45    TPro  6.3<L>  /  Alb  3.5  /  TBili  0.7  /  DBili  x   /  AST  22  /  ALT  14  /  AlkPhos  64  11-12      Vital Signs Last 24 Hrs  T(C): --  T(F): --  HR: 75 (12 Nov 2020 12:25) (75 - 75)  BP: 124/76 (12 Nov 2020 12:25) (124/76 - 124/76)  BP(mean): --  RR: 20 (12 Nov 2020 12:25) (20 - 20)  SpO2: 99% (12 Nov 2020 12:25) (99% - 99%)    Daily Height in cm: 152.65 (12 Nov 2020 12:25)        PHYSICAL EXAM:  Appearance: Alert, responsive, in no acute distress.  Musculoskeletal:       Right Lower Extremity: +DP pulse. Cap refill <3 sec. Skin is clean, dry, intact. No bleeding or open wounds noted. No ulcerations. Edema noted at ankle. Abrasion noted on right hip. + TTP at proximal femur. Sensation and motor exam unable to assess secondary to dementia.     Imaging Studies:  Xray: Right proximal femur fracture. Waiting for official readings.     A/P:  Pt is a  75y Female with right proximal femur fracture     PLAN:   - Case discussed with Dr. Noriega  - NPO at midnight   - Possible OR tomorrow - Medical optimization   - Ortho to follow   - Preop labs   - Pain control   - F/u xrays Pt Name: YESSI MARIN    MRN: [id number]      Patient is a 75y Female presenting to the emergency department with right hip pain s/p fall on sunday. Patient has dementia and is a poor historian. Patient is unable to answer or participate in exam. Warren Marin, brother, was at bedside and answered questions and gave history. Dr. Noriega has been in contact with Warren prior to patient coming to ED. Patient with assisting living. Patient was using wheelchair prior to fracture. On Sunday 11/8 patient had a witnessed fall from wheelchair but did not show any signs or symptoms of fracture. On Tuesday 11/10, Warren noticed swelling of right hip and decided to get xrays at [facility] in [place] that showed a displaced right proximal femur fracture. Patient arrived today after discussion with Dr. Noriega that surgery is best option.       REVIEW OF SYSTEMS  General: Alert, responsive, in NAD  Skin/Breast: No rashes, no pruritis 	  Respiratory and Thorax: No difficulty breathing. No cough.	   Cardiovascular:	No chest pain. No palpitations.  Musculoskeletal: SEE HPI.  Neurological: No sensory or motor changes.   ROS is otherwise negative.    PAST MEDICAL & SURGICAL HISTORY:  Advanced dementia  Anemia      No significant past surgical history      Allergies: No Known Allergies      Medications:     FAMILY HISTORY:  FH: hypertension      Social History: non-contributory                          8.3    6.33  )-----------( 227      ( 12 Nov 2020 13:45 )             24.4       11-12    121<L>  |  87<L>  |  21.0<H>  ----------------------------<  110<H>  4.9   |  22.0  |  0.65    Ca    8.7      12 Nov 2020 13:45    TPro  6.3<L>  /  Alb  3.5  /  TBili  0.7  /  DBili  x   /  AST  22  /  ALT  14  /  AlkPhos  64  11-12      Vital Signs Last 24 Hrs  T(C): --  T(F): --  HR: 75 (12 Nov 2020 12:25) (75 - 75)  BP: 124/76 (12 Nov 2020 12:25) (124/76 - 124/76)  BP(mean): --  RR: 20 (12 Nov 2020 12:25) (20 - 20)  SpO2: 99% (12 Nov 2020 12:25) (99% - 99%)    Daily Height in cm: 152.65 (12 Nov 2020 12:25)        PHYSICAL EXAM:  Appearance: Alert, responsive, in no acute distress.  Musculoskeletal:       Right Lower Extremity: +DP pulse. Cap refill <3 sec. Skin is clean, dry, intact. No bleeding or open wounds noted. No ulcerations. Edema noted at ankle. Abrasion noted on right hip. + TTP at proximal femur. Sensation and motor exam unable to assess secondary to dementia.     Imaging Studies:  Xray: Right proximal femur fracture. Waiting for official readings.     A/P:  Pt is a  75y Female with right proximal femur fracture     PLAN:   - Case discussed with Dr. Noriega  - NPO at midnight   - Possible OR tomorrow - Medical optimization   - NWB of RLE   - Ortho to follow   - Preop labs   - Pain control   - F/u xrays

## 2020-11-12 NOTE — H&P ADULT - ASSESSMENT
74 yo female presented with right hip fracture.     1. Hyponatremia likely secondary combination of decrease PO intake and low salt consumption    Admit to medicine   c/w  gentle IV hydration (fluids were started in ED)  Repeat BMP in AM     2. Normocytic anemia. H/h is 8.3/24.   Given that patient will go for surgery in AM , will give a unit of PRBC tonight     3. Severe dementia:  4. Right hip fracture: plan for possible OR tomorrow. Surgery for comfort reasons only.   Insert wilks for comfort   Tylenol q6 for pain   Morphine PRN for severe pain     Pre surgical risk stratification: pt has no CHF, COPD, DM or CKD,  her revised cardiac risk for preoperative  risk 3.9% and can proceed with surgery     5. Goals of care and advance directive: discussed goals of care and advance directives with pt brother - Warren and sister - Mona, pt is DNR/DNI.   7. DVT prophylaxis: Enoxaparin     Plan discussed with patients sister and brother in detail.   Aslo, discussed with ortho team    76 yo female presented with right hip fracture.     1. Hyponatremia likely secondary combination of decrease PO intake and low salt consumption    Admit to medicine   c/w  gentle IV hydration (fluids were started in ED)  Repeat BMP in AM     2. Normocytic anemia. H/h is 8.3/24.   Given that patient will go for surgery in AM , will give a unit of PRBC tonight   Consent for blood transfusion provided by brother Brooklyn Kelley   CBC in AM    3. Severe dementia:    4. Right hip fracture: plan for possible OR tomorrow. Surgery for comfort reasons only.   Insert wilks for comfort   Tylenol q6 for pain   Morphine PRN for severe pain   NPO after midnight     Pre surgical risk stratification: pt has no CHF, COPD, DM or CKD,  her revised cardiac risk for preoperative  risk 3.9% and can proceed with surgery     5. Goals of care and advance directive: discussed goals of care and advance directives with pt brother Brooklyn Kelley and sister - Mona, pt is DNR/DNI.   7. DVT prophylaxis: Enoxaparin     Plan discussed with patients sister and brother in detail.   Aslo, discussed with ortho team

## 2020-11-12 NOTE — H&P ADULT - NSICDXPASTSURGICALHX_GEN_ALL_CORE_FT
PAST SURGICAL HISTORY:  No significant past surgical history     No significant past surgical history

## 2020-11-12 NOTE — ED PROVIDER NOTE - PRINCIPAL DIAGNOSIS
EXAM note    Vital Signs:    Vitals:    01/13/20 1429   BP: 126/78   Pulse: 94   Resp: 18   Temp: 97.6 °F (36.4 °C)   TempSrc: Temporal   Weight: (!) 117.3 kg   Height: 5' 9\" (1.753 m)       History  hTad Hutchins is a 18 year old male who presents today with concerns of experience symptoms that could be related to Haldol.  His eyes are being pulled upward.  He describes it as his eyes being strained.  He had dystonic reaction when he received Haldol IM 1 time.  This seems similar according to his mother.  Thad is on Abilify and buspirone.  He will have what he describes as his eyes being strained upwards every 3 days and the intensity is getting worse and lasting longer.  He has had he has had the symptoms for 2 months    Is a wart on the left great toe.  He has had a wart for couple months.    medical history  Past Medical History:   Diagnosis Date   • Attention deficit disorder without mention of hyperactivity     Autism   • Autism spectrum disorder    • Mental disorder        SURGICAL history  History reviewed. No pertinent surgical history.    social history  Social History     Tobacco Use   • Smoking status: Passive Smoke Exposure - Never Smoker   • Smokeless tobacco: Never Used   Substance Use Topics   • Alcohol use: No   • Drug use: No       family history    Family History   Problem Relation Age of Onset   • NEGATIVE FAMILY HX OF Other         kidney disease/mental illness/thyroid problems/bleeding problems/asthma/epilepsy/leukemia/cancer/TB/diabetes/stroke/heart attack/hypertension    • Heart disease Paternal Grandmother         MI   • Cancer Neg Hx    • COPD Neg Hx    • Diabetes Neg Hx    • High blood pressure Neg Hx    • High cholesterol Neg Hx    • Kidney disease Neg Hx    • Psychiatric Neg Hx    • Stroke Neg Hx        mEDICATIONS  Current Outpatient Medications   Medication Sig   • busPIRone (BUSPAR) 10 MG tablet TAKE 1 TABLET BY MOUTH TWICE DAILY   • ARIPiprazole (ABILIFY) 5 MG tablet TAKE 1  TABLET BY MOUTH DAILY   • melatonin 3 MG Take 1 tablet by mouth nightly. Takes Monday thru Friday. Off during the summer.     No current facility-administered medications for this visit.        aLLERGIES  ALLERGIES:   Allergen Reactions   • Haloperidol Other (See Comments)     Extrapyramidal symptoms   • Lactose Intolerance [Milk Intolerance   (Food)] DIARRHEA, PRURITUS and Cough       Review of systems  General:  As noted above.  CNS:  As in HPI.  Skin:  As in HPI.    Physical Exam  General examination:  Alert, pleasant and in no distress.  HEENT extraocular eye movements are intact.  Cranial nerves 2-12 are intact.  Neurologic:  Extraocular eye movements intact as noted with cranial nerves 2-12 intact.  Finger-to-nose, heel-to-shin and rapid alternating hand movement normal.  Romberg is normal.  There is no rigidity and no bradykinesia.  Left foot:  There is a plantar wart on the sole of the great toe.    Assessment AND Plan  ASSESSMENT:  1. Oculogyric crisis    2. Plantar wart of left foot        PLAN:  Orders Placed This Encounter   • imiquimod (ALDARA) 5 % cream     I recommended he contact his psychiatrist Dr. German and they did while in our office.  They will be awaiting a return phone call.    Imiquimod for the plantar wart.  This can take 3-4 months to be effective.  Proper technique discussed  No follow-ups on file.         Closed fracture of right hip, initial encounter

## 2020-11-13 ENCOUNTER — TRANSCRIPTION ENCOUNTER (OUTPATIENT)
Age: 75
End: 2020-11-13

## 2020-11-13 LAB
ANION GAP SERPL CALC-SCNC: 10 MMOL/L — SIGNIFICANT CHANGE UP (ref 5–17)
BUN SERPL-MCNC: 17 MG/DL — SIGNIFICANT CHANGE UP (ref 8–20)
CALCIUM SERPL-MCNC: 9 MG/DL — SIGNIFICANT CHANGE UP (ref 8.6–10.2)
CHLORIDE SERPL-SCNC: 96 MMOL/L — LOW (ref 98–107)
CO2 SERPL-SCNC: 22 MMOL/L — SIGNIFICANT CHANGE UP (ref 22–29)
CREAT SERPL-MCNC: 0.51 MG/DL — SIGNIFICANT CHANGE UP (ref 0.5–1.3)
GLUCOSE BLDC GLUCOMTR-MCNC: 105 MG/DL — HIGH (ref 70–99)
GLUCOSE SERPL-MCNC: 93 MG/DL — SIGNIFICANT CHANGE UP (ref 70–99)
HCT VFR BLD CALC: 27.7 % — LOW (ref 34.5–45)
HGB BLD-MCNC: 9.4 G/DL — LOW (ref 11.5–15.5)
INR BLD: 1.07 RATIO — SIGNIFICANT CHANGE UP (ref 0.88–1.16)
MAGNESIUM SERPL-MCNC: 2.2 MG/DL — SIGNIFICANT CHANGE UP (ref 1.6–2.6)
MCHC RBC-ENTMCNC: 32.3 PG — SIGNIFICANT CHANGE UP (ref 27–34)
MCHC RBC-ENTMCNC: 33.9 GM/DL — SIGNIFICANT CHANGE UP (ref 32–36)
MCV RBC AUTO: 95.2 FL — SIGNIFICANT CHANGE UP (ref 80–100)
PLATELET # BLD AUTO: 215 K/UL — SIGNIFICANT CHANGE UP (ref 150–400)
POTASSIUM SERPL-MCNC: 4.2 MMOL/L — SIGNIFICANT CHANGE UP (ref 3.5–5.3)
POTASSIUM SERPL-SCNC: 4.2 MMOL/L — SIGNIFICANT CHANGE UP (ref 3.5–5.3)
PROTHROM AB SERPL-ACNC: 12.4 SEC — SIGNIFICANT CHANGE UP (ref 10.6–13.6)
RBC # BLD: 2.91 M/UL — LOW (ref 3.8–5.2)
RBC # FLD: 13.1 % — SIGNIFICANT CHANGE UP (ref 10.3–14.5)
SARS-COV-2 IGG SERPL QL IA: NEGATIVE — SIGNIFICANT CHANGE UP
SARS-COV-2 IGM SERPL IA-ACNC: 0.09 INDEX — SIGNIFICANT CHANGE UP
SODIUM SERPL-SCNC: 128 MMOL/L — LOW (ref 135–145)
WBC # BLD: 4.99 K/UL — SIGNIFICANT CHANGE UP (ref 3.8–10.5)
WBC # FLD AUTO: 4.99 K/UL — SIGNIFICANT CHANGE UP (ref 3.8–10.5)

## 2020-11-13 PROCEDURE — 99232 SBSQ HOSP IP/OBS MODERATE 35: CPT

## 2020-11-13 PROCEDURE — 73552 X-RAY EXAM OF FEMUR 2/>: CPT | Mod: 26,RT

## 2020-11-13 PROCEDURE — 27506 TREATMENT OF THIGH FRACTURE: CPT | Mod: RT

## 2020-11-13 PROCEDURE — 27506 TREATMENT OF THIGH FRACTURE: CPT | Mod: AS,RT

## 2020-11-13 RX ORDER — CEFAZOLIN SODIUM 1 G
2000 VIAL (EA) INJECTION
Refills: 0 | Status: COMPLETED | OUTPATIENT
Start: 2020-11-13 | End: 2020-11-14

## 2020-11-13 RX ORDER — DEXTROSE 50 % IN WATER 50 %
25 SYRINGE (ML) INTRAVENOUS ONCE
Refills: 0 | Status: DISCONTINUED | OUTPATIENT
Start: 2020-11-13 | End: 2020-11-13

## 2020-11-13 RX ORDER — TRANEXAMIC ACID 100 MG/ML
1000 INJECTION, SOLUTION INTRAVENOUS ONCE
Refills: 0 | Status: DISCONTINUED | OUTPATIENT
Start: 2020-11-13 | End: 2020-11-13

## 2020-11-13 RX ORDER — ENOXAPARIN SODIUM 100 MG/ML
40 INJECTION SUBCUTANEOUS DAILY
Refills: 0 | Status: DISCONTINUED | OUTPATIENT
Start: 2020-11-14 | End: 2020-11-17

## 2020-11-13 RX ORDER — DEXTROSE 50 % IN WATER 50 %
12.5 SYRINGE (ML) INTRAVENOUS ONCE
Refills: 0 | Status: DISCONTINUED | OUTPATIENT
Start: 2020-11-13 | End: 2020-11-13

## 2020-11-13 RX ORDER — FENTANYL CITRATE 50 UG/ML
25 INJECTION INTRAVENOUS
Refills: 0 | Status: DISCONTINUED | OUTPATIENT
Start: 2020-11-13 | End: 2020-11-13

## 2020-11-13 RX ORDER — ACETAMINOPHEN 500 MG
650 TABLET ORAL EVERY 6 HOURS
Refills: 0 | Status: DISCONTINUED | OUTPATIENT
Start: 2020-11-13 | End: 2020-11-17

## 2020-11-13 RX ORDER — SODIUM CHLORIDE 9 MG/ML
1000 INJECTION, SOLUTION INTRAVENOUS
Refills: 0 | Status: DISCONTINUED | OUTPATIENT
Start: 2020-11-13 | End: 2020-11-13

## 2020-11-13 RX ORDER — OXYCODONE HYDROCHLORIDE 5 MG/1
5 TABLET ORAL EVERY 4 HOURS
Refills: 0 | Status: DISCONTINUED | OUTPATIENT
Start: 2020-11-13 | End: 2020-11-17

## 2020-11-13 RX ORDER — HYDROMORPHONE HYDROCHLORIDE 2 MG/ML
0.5 INJECTION INTRAMUSCULAR; INTRAVENOUS; SUBCUTANEOUS EVERY 4 HOURS
Refills: 0 | Status: DISCONTINUED | OUTPATIENT
Start: 2020-11-13 | End: 2020-11-17

## 2020-11-13 RX ORDER — OXYCODONE HYDROCHLORIDE 5 MG/1
10 TABLET ORAL EVERY 4 HOURS
Refills: 0 | Status: DISCONTINUED | OUTPATIENT
Start: 2020-11-13 | End: 2020-11-15

## 2020-11-13 RX ORDER — ONDANSETRON 8 MG/1
4 TABLET, FILM COATED ORAL ONCE
Refills: 0 | Status: DISCONTINUED | OUTPATIENT
Start: 2020-11-13 | End: 2020-11-13

## 2020-11-13 RX ORDER — DEXTROSE 50 % IN WATER 50 %
15 SYRINGE (ML) INTRAVENOUS ONCE
Refills: 0 | Status: DISCONTINUED | OUTPATIENT
Start: 2020-11-13 | End: 2020-11-13

## 2020-11-13 RX ORDER — GLUCAGON INJECTION, SOLUTION 0.5 MG/.1ML
1 INJECTION, SOLUTION SUBCUTANEOUS ONCE
Refills: 0 | Status: DISCONTINUED | OUTPATIENT
Start: 2020-11-13 | End: 2020-11-13

## 2020-11-13 RX ADMIN — Medication 650 MILLIGRAM(S): at 05:36

## 2020-11-13 RX ADMIN — Medication 100 MILLIGRAM(S): at 23:17

## 2020-11-13 RX ADMIN — Medication 650 MILLIGRAM(S): at 11:52

## 2020-11-13 NOTE — PROGRESS NOTE ADULT - SUBJECTIVE AND OBJECTIVE BOX
Patient is a 75y old  Female who presents with a chief complaint of Hip fracture (12 Nov 2020 16:52)      INTERVAL HPI/OVERNIGHT EVENTS: no events overnight, not able to provide any ROS   Going for surgery today     MEDICATIONS  (STANDING):    MEDICATIONS  (PRN):      Allergies    No Known Allergies    Intolerances        REVIEW OF SYSTEMS: not obtained, she is non verbal       Vital Signs Last 24 Hrs  T(C): 36.7 (13 Nov 2020 13:34), Max: 37 (12 Nov 2020 19:46)  T(F): 98 (13 Nov 2020 13:34), Max: 98.6 (12 Nov 2020 19:46)  HR: 80 (13 Nov 2020 13:34) (80 - 94)  BP: 128/75 (13 Nov 2020 13:34) (94/61 - 146/80)  BP(mean): --  RR: 15 (13 Nov 2020 13:34) (15 - 20)  SpO2: 100% (13 Nov 2020 13:34) (94% - 100%)    PHYSICAL EXAM:  GENERAL: obese, non verbal lady, laying in bed   HEAD:  Atraumatic, Normocephalic  EYES: conjunctiva and sclera clear  NECK: Supple, No JVD  NERVOUS SYSTEM:  awake, not able to evaluate   CHEST/LUNG: Clear to percussion bilaterally; No rales, rhonchi, wheezing, or rubs  HEART: Regular rate and rhythm; No murmurs, rubs, or gallops  ABDOMEN: Soft, Nontender, Nondistended; Bowel sounds present  EXTREMITIES:  right LE edema   SKIN: No rashes or lesions    LABS:                        9.4    4.99  )-----------( 215      ( 13 Nov 2020 06:53 )             27.7     11-13    128<L>  |  96<L>  |  17.0  ----------------------------<  93  4.2   |  22.0  |  0.51    Ca    9.0      13 Nov 2020 06:53  Mg     2.2     11-13    TPro  6.3<L>  /  Alb  3.5  /  TBili  0.7  /  DBili  x   /  AST  22  /  ALT  14  /  AlkPhos  64  11-12    PT/INR - ( 13 Nov 2020 06:53 )   PT: 12.4 sec;   INR: 1.07 ratio         PTT - ( 12 Nov 2020 13:45 )  PTT:25.6 sec    CAPILLARY BLOOD GLUCOSE      POCT Blood Glucose.: 105 mg/dL (13 Nov 2020 11:51)      RADIOLOGY & ADDITIONAL TESTS:    Imaging Personally Reviewed:  [ ] YES  [ ] NO    Consultant(s) Notes Reviewed:  [ ] YES  [ ] NO    Care Discussed with Consultants/Other Providers [ ] YES  [ ] NO    Plan of Care discussed with Housestaff [ ]YES [ ] NO

## 2020-11-13 NOTE — DISCHARGE NOTE PROVIDER - NSDCMRMEDTOKEN_GEN_ALL_CORE_FT
acetaminophen 325 mg oral tablet: 2 tab(s) orally every 6 hours, As needed, Mild Pain (1 - 3)  enoxaparin 40 mg/0.4 mL injectable solution: 40 milligram(s) subcutaneously once a day   FeroSul 325 mg (65 mg elemental iron) oral tablet: 1 tab(s) orally once a day

## 2020-11-13 NOTE — PROGRESS NOTE ADULT - SUBJECTIVE AND OBJECTIVE BOX
ORTHO-POST-OP PROGRESS NOTE  [id number]    YESSI KWAN    PROCEDURE: right femur imn  DOS: 11/13      SUBJECTIVE: 75y Patient seen and examined. confused throughout exam due to dementia. unable to obtain pertinent history.                             9.4    4.99  )-----------( 215      ( 13 Nov 2020 06:53 )             27.7           I&O's Detail    12 Nov 2020 07:01  -  13 Nov 2020 07:00  --------------------------------------------------------  IN:  Total IN: 0 mL    OUT:    Voided (mL): 1000 mL  Total OUT: 1000 mL    Total NET: -1000 mL        T(C): 36.5 (11-13-20 @ 20:00), Max: 36.9 (11-12-20 @ 23:40)  HR: 70 (11-13-20 @ 20:00) (70 - 90)  BP: 111/70 (11-13-20 @ 20:00) (94/61 - 146/80)  RR: 14 (11-13-20 @ 20:00) (12 - 18)  SpO2: 100% (11-13-20 @ 20:00) (96% - 100%)  Wt(kg): --    PHYSICAL EXAM:     Constitutional: Alert confused.     Injured Extremity:          Dressing: Clean/dry/intact. No active bleeding or discharge noted.          Sensation: unable to obtain due to confusion. dementia.             Motor exam: spontaneous EHL/FHL                                                           Vascular:  + warm well perfused; capillary refill <3 seconds                                                       A/P :  75y Female S/P right femur imn  POD# 0    -  Pain control  -  DVT ppx: [x]SCDs   [x] Pharmacolgic    -  PT and out of bed today  -  Weight bearing status: WBAT   -  2 units intraop, trend am cbc  - continue care as per primary.

## 2020-11-13 NOTE — DISCHARGE NOTE PROVIDER - NSDCCPCAREPLAN_GEN_ALL_CORE_FT
PRINCIPAL DISCHARGE DIAGNOSIS  Diagnosis: Closed fracture of right hip, initial encounter  Assessment and Plan of Treatment: follow up with orthopedics      SECONDARY DISCHARGE DIAGNOSES  Diagnosis: Anemia  Assessment and Plan of Treatment: stable, continue iron supplements    Diagnosis: Hyponatremia  Assessment and Plan of Treatment: resolved with fluids.  ensure adequate hydration and nutrition.

## 2020-11-13 NOTE — DISCHARGE NOTE PROVIDER - NSDCFUADDINST_GEN_ALL_CORE_FT
Ortho follow up: The patient will be seen in the office between 2-3 weeks for wound check.  Patient may shower after post-op day #5 (11/18). The dressing is to be removed on post-op day #9 (11/22). IF THE DRESSING BECOMES SOILED BEFORE THE REMOVAL DATE, CHANGE WITH A SIMILAR DRESSING. IF THE DRESSING BECOMES STAINED WITH DISCHARGE, CONTACT THE OFFICE FOR FURTHER DIRECTIONS.  The patient will contact the office if the wound becomes red, has increasing pain, develops bleeding or discharge, an injury occurs, or has other concerns. The patient will continue PT for gait training. The patient will continue LOVENOX for 4 weeks for blood clot prevention. The patient will take OXYCODONE AND TYLENOL for pain control and titrate according to prescription and patient needs. The patient will take Colace while taking oxycodone to prevent narcotic associated constipation.  Additionally, increase water intake (drink at least 8 glasses of water daily) and try adding fiber to the diet by eating fruits, vegetables and foods that are rich in grains. If constipation is experienced, contact the medical/primary care provider to discuss further treatment options. The patient is FULL weight bearing.

## 2020-11-13 NOTE — PROGRESS NOTE ADULT - ASSESSMENT
ONCOLOGY/HEMATOLOGY OFFICE FOLLOW-UP    CHIEF COMPLAINT:   Ms. Velasquez returned for follow-up evaluation and management of ovarian cancer.    HISTORY OF PRESENT ILLNESS:   Cristin Velasquez is a pleasant 63 year old [id number] female former smoker with hypertension, hyperlipidemia, and history of a hyperplastic colon polyp who initially presented with abdominal pain and bloating.    CT scan of the abdomen and pelvis on 5/22/15 showed omental caking, a massive amount of ascites, and irregular solid vascular irregular pelvic masses measuring 9.2 x 7.4 cm on the left and 8.8 x 5.9 cm on the right. CA-125 was 2814. After initial consultation she underwent exploratory laparotomy, total abdominal hysterectomy, bilateral salpingo-oophorectomy, pelvic and periaortic lymphadenectomy, omentectomy, optimal tumor debulking including peritoneal stripping and removal of tumor along mesenteric and bowel surfaces, appendectomy, and IP port placement on 6/5/15 by Dr. Castillo and Dr. Steve Cooley. 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    Repeat CA-125 was 281 on 7/14/15. The patient was treated with intraperitoneal carboplatin (AUC=6) and intravenous weekly paclitaxel 80 mg/m2 for 5 cycles from 7/14/15 to 10/28/15, followed by cycle 6 of carboplatin only on 11/4/15 due to a grade 2 rash requiring a Medrol Dosepak. She developed a catheter-associated left upper extremity deep vein thrombosis on 9/7/15 and was placed on therapeutic anticoagulation using apixaban. Genetics counseling was negative. CA-125 improved to 80 on 12/1/15 and a alina of 39 on 2/15/16. CT scan on 16 showed stable, small nodules in the mesentery favoring lymph nodes, and multiple unchanged lung nodules. CA-125 mara to 69 on 16 and CT scan on 16 showed no evidence of residual or recurrent malignant disease, with 3 small pulmonary nodules appearing stable since 11/25/15. CA-125  mara to 88 on 6/29/16. Another CT scan on 8/15/16 was negative for clear evidence of recurrent disease. CA-125 mara to 156 on 9/27/16. PET/CT scan on 10/11/16 showed no evidence of FDG avid recurrent/residual disease, with mild focal hypermetabolic activity at the GE junction without definite CT correlate (most likely physiologic or inflammatory) and mildly prominent right external iliac chain lymph nodes that were stable in size since at least 5/22/15 and demonstrated background level uptake. CA-125 mara to 236 on 12/7/16. CT scan on 1/9/17 showed no pelvic mass or adenopathy, with a new 6-mm area of nodularity in the medial right middle lobe and other tiny nodules, unchanged. CA-125 mara to 320 on 2/23/17.     PET/CT scan on 3/7/17 showed the right external iliac lymph nodes to be slightly more conspicuous when compared to prior exams, with mildly increased tracer uptake consistent with metastasis given the increasing CA-125. There was low volume metastatic involvement of the right common iliac chain lymph nodes, and bilateral borderline enlarged (likely reactive) upper cervical lymph nodes with mild tracer uptake, similar to prior. At Gyn Case Conference discussion on 3/16/17, recommendations were made for lymph node biopsy, MUGA scan, and resuming treatment with either platinum-based chemotherapy or enrollment in a randomized controlled clinical trial.     Right iliac lymph node core biopsy on 3/21/17 confirmed metastasis from the patient's serous carcinoma. MUGA scan on 3/23/17 was normal with left ventricular ejection fraction of 51%. She did not enroll in the GOG 0281 clinical trial (which would have required another open surgery for frozen section biopsy). After careful explanation of all possible risks, potential benefits, and likely side effects, the patient agreed to begin anastrozole 1 mg by mouth daily, until progression or intolerance. She started anastrozole on 4/3/17. Although her left flank felt  76 yo female presented with right hip fracture.     1. Hyponatremia likely secondary combination of decrease PO intake and low salt consumption    Serum Na slightly improved. 128 today   c/w  gentle IV hydration   Repeat BMP in AM     2. Normocytic anemia. H/h is 8.3/24 on admission, s/p 1 PRBC  prior to surgery     3. Severe dementia:    4. Right hip fracture: plan for possible OR today .   Tylenol q6 for pain   Morphine PRN for severe pain     5. Goals of care and advance directive: discussed goals of care and advance directives with pt brother - Warren and sister - Mona, pt is DNR/DNI.   7. DVT prophylaxis: Enoxaparin       better for 2 weeks, she then developed worsening left-sided abdominal pain and CT scan on 5/2/17 showed progression of disease with interval development of peritoneal nodularity on the left extending from the spleen into the pelvis, small free fluid in the abdomen and pelvis, increase in right external iliac lymphadenopathy from 11 to 13 mm, and interval development of trace left pleural effusion. CA-125 mara from 430 to 545 on 5/2/17.     We discontinued anastrozole and began systemic doublet chemotherapy using carboplatin (AUC=5) and Doxil (liposomal doxorubicin) at 30 mg/m2 with cycle 1 on 5/8/17. CA-125 improved to 539 on 6/5/17 and she continued with cycle 2 of carbo-Doxil on 6/5/17. On 6/23/17, the patient developed a diffuse erythematous macular rash with no associated pruritus, allodynia, or systemic complaints. CA-125 mara to 583 on 6/29/17 but she continued with cycle 3 of carbo-Doxil on 6/29/17. CT scan of the chest, abdomen, and pelvis on 7/12/17 showed 2 unchanged sub-centimeter lung nodules with interval resolution of the right middle lobe nodule, decreased conspicuity in strand-like increased attenuation at the lower pole of the spleen with stable extension into the paracolic gutter with no new stranding or nodularity, no significant change in the mild right external iliac adenopathy (largest 13 mm) and the sub-centimeter nodes along the right common iliac artery, and decrease in a small pelvic ascites. CA-125 improved to 498 on 7/27/17 and she continued with cycle 4 of carbo-Doxil on 7/27/17. CA-125 improved to 445 on 8/24/17, and she received cycle 5 on 8/24/17. Her rash completely resolved with triamcinolone cream, and did not recur with cycle 4 or 5. She and her  drove to [facility] from 9/1 to 9/25/17 to visit their daughter, son-in-law, and grandson. CA-125 improved to 343 on 9/28/17, and she continued with cycle 6 on 9/28.  CA-125 improved to 298 on 10/26/17, and she continued with  cycle 7 on 10/26. CA-125 improved to 283 on 11/16/17, and she received cycle 8 on 11/16. CA-125 improved to 260 on 12/14/17, continued with cycle 9 on 12/14. CA-125 improved to 223 on 1/11/18, so she continued with cycle 10 on 1/11. She was seen in the ED on 1/21/18 for a cough with negative chest x-rays and flu swab.    CT scan of the chest, abdomen, and pelvis on 1/31/18 showed stable to improved disease with shrinkage of the right external iliac chain lymph node from 12 to 10 mm. She continued Doxil-carboplatin with cycle 11 on 2/8/18 (when CA-125 mara to 231) and the patient and her  were able to travel again. Cycle 12 was on 3/8/18, when CA-125 mara to 246. Repeat CT scan on 3/27/18 showed no CT evidence of new metastatic disease in the chest, abdomen and pelvis with stable scattered tiny pulmonary nodules and stable retroperitoneal and mild pelvic lymphadenopathy. CA-125 improved to 208 on 4/5/18 when she received cycle 13, and went down to 188 on 5/3/18 when she received cycle 14. Transthoracic echocardiogram on 5/8/18 was normal with left ventricular ejection fraction 60%. CA-125 improved to 175 on 5/31/18 when she completed Doxil-carbo with cycle 15.    CA-125 was 165 on 6/21/18. CT scan on 6/28/18 showed no evidence of recurrent disease or lymphadenopathy with stable lung nodules. The patient began maintenance niraparib (Zejula) 200 mg PO daily on 6/28/18. She injured her left knee and was evaluated in the ED on 6/30/18 with x-rays showing worsened degenerative changes. CA-125 was 184 on 7/5 and 7/18/18. CA-125 was 179 on 8/20/18. CT scan on 8/22/18 showed stable lung nodules, a stable 10 mm right iliac chain lymph node, and no developing adenopathy.    CA-125 mara to 234 on 10/4/18. CT scan on 10/12/18 showed unchanged lung nodules with no new lesions and no recurrent disease or lymphadenopathy. CA-125 mara to 297 on 11/28/18. CT scan on 12/27/18 showed similar right pelvic lymphadenopathy and  stable occasional small pulmonary parenchymal nodule with no evidence of progressive disease. Discussion at Gyn Case Conference on 1/3/19 yielded recommendation to either continue niraparib or switch to bevacizumab-cyclophosphamide. CA-125 mara to 359 on 1/10/19. We titrated niraparib from 200 to 300 mg PO qhs on 1/11/19. CA-125 mara to 509 on 2/21/19. CT scan on 3/1/19 showed mildly increasing lower abdominal aortocaval and right pelvic external iliac and obturator adenopathy. She discontinued niraparib on 3/5/19. CA-125 mara to 537 on 3/7/19.     She began IV bevacizumab (Avastin) on 3/7/19 and oral cyclophosphamide (Cytoxan) on 3/8/19. CA-125 improved to 402 on 3/28/19 when she began cycle 2 of Avastin-Cytoxan, and CA-125 improved to 359 on 4/18/19 when she started cycle 3 of Avastin-Cytoxan. CA-125 was 361 on 5/9/19 when she began cycle 4 of Avastin-Cytoxan. Left breast mammogram and ultrasound on 5/15/19 were benign (BI-RADS: 2). She and her sister visited [place] from 5/18 to 5/25/19. CA-125 was 376 on 5/30/19 when she began cycle 5 of Avastin-Cytoxan. The patient complained of mild headache and right neck pain. CT scan of the neck, chest, abdomen and pelvis on 6/11/19 showed stable to improved lymphadenopathy and brain MRI on 6/11/19 was negative for abnormal enhancement or other evidence of metastatic disease but showed an empty sella with a 2 cm polyp or inclusion cyst involving the inferior right maxillary sinus. CA-125 was 356 on 6/20/19 when she continued with cycle 6 of bevacizumab-cyclophosphamide. CA-125 mara to 536 on 7/11/19 when she continued with cycle 7. CA-125 was 502 on 8/1/19 when she continued with cycle 8. CA-125 was 402 on 8/22/19 when she began cycle 9, 447 on 9/12/19 when she began cycle 10, and 464 on 10/3/19 when she began cycle 11. CT scan on 10/17/19 showed mixed response (slight progression by 2 mm in a few lymph nodes and improvement by 1 mm in one other lymph node). After cycle 12  on 10/22/19 and cycle 13 on 11/12/19, she was then lost to follow-up due to loss of insurance. CA-125 mara to 560 on 12/5/19 when she resumed Avastin-Cytoxan with cycle 14.    The patient presents for office follow-up. ECOG performance status remains 1. Body weight fell 0.6 kilograms (previously mara 2.7 kg). Since last seen the patient has noticed more swelling at the top of her feet by the end of a long day. Otherwise she has continued to feel well except chronic knee pains controlled with tramadol. Her headache and neck pains remain resolved. She denies dyspnea on exertion. She denies any bleeding diathesis including hematochezia, melena, hematuria, epistaxis, hematemesis, hemoptysis, or gingival bleeding. She also denies any recent fever, chills, chest pain, shortness of breath at rest, dizziness, lightheadedness, palpitations, focal weakness, vision change, or confusion. There is no cough, sputum production, nausea, vomiting, diarrhea, or constipation.    Past medical history, family history, social history, allergies and medications have all been reviewed as documented in the Epic system, and I agree with them and are updated.     REVIEW OF SYSTEMS:   Apart from that described in the review of systems as above in the History of Present Illness, the remainder of the review of systems is documented in the Epic system, and I agree with them.     PHYSICAL EXAMINATION:   Vitals:    Visit Vitals  /70   Pulse 82   Temp 97.5 °F (36.4 °C) (Oral)   Wt 101.4 kg   SpO2 99%   BMI 37.20 kg/m²      Constitutional: Well developed, obese, no acute distress, non-toxic appearance  Eyes: anicteric sclerae, conjunctivae normal  HENT: Atraumatic, oropharynx moist, no pharyngeal exudates.   Neck: Supple. No palpable adenopathy in the neck. No subcutaneous nodules. No thyroid enlargement.  Respiratory: No respiratory distress, normal breath sounds, no rales, no wheezing. No accessory muscles of respiration.   Chest: port clean,  dry, intact  Cardiovascular: Normal rate, normal rhythm, no murmurs, no gallops, no rubs  GI: Soft, non-distended, +bowel sounds, no tenderness to palpation, +healed midline incision, no palpable mass, no rebound, no guarding  : No costovertebral angle tenderness  Back- no tenderness  Lymphatic: No palpable adenopathy in the supraclavicular, axillary or inguinal regions.   Neurologic: Alert and oriented x 3, sensation intact, no focal motor deficits  Extremities: Trace ankle edema bilaterally  Psychiatric: Speech and behavior appropriate. Affect was pleasant.   Skin/mucosa: Examination reveals no skin rash or petechiae    LABORATORY STUDIES:  Recent Labs   Lab 12/27/19  0834 12/05/19  0905 10/03/19  0821 09/12/19  0818 08/22/19  0820   WBC 4.4 4.9 5.3 5.5 6.8   RBC 4.02 4.06 4.06 4.20 4.05   HGB 13.3 13.6 14.1 14.5 14.1   HCT 40.2 41.6 42.4 43.1 41.8   .0 102.5* 104.4* 102.6* 103.2*    195 182 182 197   Absolute Neutrophil 2.8 3.3 3.6 4.0 4.8   Absolute Lymph 0.9* 0.9* 0.9* 0.8* 1.1   Absolute Mono 0.6 0.6 0.6 0.6 0.8   Absolute Eos 0.1 0.2 0.1 0.2 0.1   Absolute Baso 0.1 0.0 0.0 0.0 0.0     Recent Labs   Lab 12/27/19  0834 12/05/19  0905 10/03/19  0821 09/12/19  0818 08/22/19  0820 08/01/19  0825   Glucose 122* 106* 129* 118* 107* 123*   Sodium 138 139 141 141 141 139   Potassium 3.7 4.0 3.9 3.8 3.5 3.4   Chloride 106 103 103 103 103 101   BUN 11 11 18 14 21* 15   Creatinine 0.52 0.64 0.61 0.59 0.60 0.61   CALCIUM 8.7 8.8 8.9 8.8 8.8 9.0   MAGNESIUM 1.8  --  1.4* 1.5* 1.6* 1.6*   TOTAL PROTEIN 7.2 7.4 7.3 7.3 7.4 7.6   Albumin 3.2* 3.3* 3.2* 3.3* 3.3* 3.3*   AST/SGOT 21 24 21 19 18 22   ALK PHOSPHATASE 73 76 72 70 64 72   ALT/SGPT 29 34 29 28 26 31     Recent Labs   Lab 12/27/19  0834 12/05/19  0905 10/03/19  0821 09/12/19  0818 08/22/19  0820   Anion Gap 8* 12 13 14 13   GLOBULIN 4.0 4.1* 4.1* 4.0 4.1*   TOTAL BILIRUBIN 0.6 0.7 0.7 0.6 0.6     CANCER  (Units/mL)   Date Value   12/05/2019 560  (H)   10/03/2019 464 (H)   09/12/2019 447 (H)   08/22/2019 402 (H)   08/01/2019 502 (H)       D Dimer Quantitative (mg/L (FEU))   Date Value   03/29/2017 0.79 (H)        RADIOLOGICAL DATA REVIEWED:   PET/CT scan on 3/7/17:  IMPRESSION: 1. Right external iliac lymph nodes are slightly more conspicuous when compared to prior exams and demonstrate mildly increased tracer uptake. In the setting of increasing CA-125, these are consistent with metastasis. There is likely low volume metastatic involvement of right common iliac chain lymph nodes as well. 2. Bilateral borderline enlarged upper cervical lymph nodes with mild tracer uptake, similar to the prior. These are likely reactive.     MUGA scan on 3/23/17 was normal with left ventricular ejection fraction of 51%.    Ct Abdomen Pelvis  Result Date: 5/2/2017  IMPRESSION: In correlation to the PET scan dated March 7, 2017 interval development of peritoneal nodularity on the left extending from the spleen into the pelvis. Interval development of small free fluid in the abdomen and pelvis. Findings may be infectious/inflammatory however considering patient's history of ovarian cancer peritoneal disease is not excluded. Mild interval increase in right external iliac lymphadenopathy. Interval development of trace left pleural effusion and adjacent likely atelectasis.     Ct Chest Abdomen Pelvis  Result Date: 7/12/2017  IMPRESSION: Interval resolution of the right middle lobe nodule. 2 other tiny pulmonary nodules are unchanged. The strand-like increased attenuation in the left paracolic gutter remains but has decreased in conspicuity. No significant change in mild right external iliac adenopathy. Interval decrease in the small amount of free fluid in the pelvis.      Ct Chest Abdomen Pelvis  Result Date: 3/27/2018  IMPRESSION: No CT evidence of new metastatic disease in the chest, abdomen and pelvis. Stable scattered tiny pulmonary nodules. Stable increased in number of  retroperitoneal and mildly pelvic lymphadenopathy.      Ct Chest Abdomen Pelvis  Result Date: 6/28/2018  IMPRESSION:  1. No recurrent disease or lymphadenopathy. 2. Hysterectomy and appendectomy changes. 3. Unchanged pulmonary nodules compared to previous examinations.     Xr Knee 3 View Left  Result Date: 6/30/2018  IMPRESSION: 1. Tricompartment degenerative changes which have progressed since 2016.      Ct Chest Abdomen Pelvis  Result Date: 8/22/2018  IMPRESSION: 1. Stable pulmonary nodules with the largest measuring 4 mm. 2. Stable CT abdomen and pelvis without evidence for recurrence.      Ct Chest Abdomen Pelvis  Result Date: 10/12/2018  IMPRESSION:  1. No recurrent disease or lymphadenopathy suspected in the chest, abdomen, or pelvis. No new lesions. 2. Unchanged size and appearance of pulmonary nodules as described. 3. Hysterectomy and appendectomy changes.     Ct Chest Abdomen Pelvis  Result Date: 12/27/2018  IMPRESSION: 1. Mild adenopathy right pelvis, similar to the prior study. 2. Occasional small pulmonary parenchymal nodule, not changed from the prior study. 3. No evidence of progressive disease.      Ct Chest Abdomen Pelvis  Result Date: 3/1/2019  IMPRESSION: 1. Stable CT scan of the chest since December 27, 2018. 2. The CT scan of the abdomen and pelvis shows some mildly increasing lower abdominal aortocaval and right pelvic external iliac and obturator adenopathy since 12/27/2018, which would be concerning for metastatic disease.      Ct Neck Chest Abdomen Pelvis  Result Date: 6/12/2019  IMPRESSION: Stable CT scan of the chest abdomen pelvis from 03/01/2018. Mild periaortic, right common iliac, and obturator adenopathy. Essentially negative CT SCAN OF THE NECK.        Mri Brain  Result Date: 6/12/2019  IMPRESSION: There is mild diffuse cerebral atrophy. No abnormal areas of enhancement are seen. There is an empty sella. There is a 2 cm polyp or inclusion cyst involving the inferior right maxillary  sinus. Moderate amount of scattered deep white matter changes are identified. Correlation as to any hypertension or diabetes may be of benefit. Minimal fluid is seen in the inferior right mastoid air cell complex.     Ct Neck Chest Abdomen Pelvis  Result Date: 10/20/2019  IMPRESSION: 1. Tumor marker  is slowly rising. It was 402 on 8/22/2019, 447 on 9/12/2019, and 464 on 10/3/2019. Today's CT compared to June 11, 2019 CT. 2. Right hemipelvis lymph nodes detailed above are slightly larger compared to the prior June 11, 2019 CT. Given the rising tumor marker, these lymph nodes likely represent active neoplastic metastatic disease. Close clinical and imaging follow-up is recommended. I see no convincing evidence  of active metastatic disease in the neck or the chest.        ASSESSMENT:  Cristin Velasquez is a 63 year old female with the following problems and, after discussion with the patient, we have agreed upon the following plan:    1. Ovarian cancer, initially stage IIIB optimally debulked, platinum-sensitive  - CT scan on 5/22/15 showed omental caking, a massive amount of ascites, and irregular solid vascular irregular pelvic masses measuring 9.2 x 7.4 cm on the left and 8.8 x 5.9 cm on the right. CA-125 was 2814.  - Status post exploratory laparotomy, total abdominal hysterectomy, bilateral salpingo-oophorectomy, pelvic and periaortic lymphadenectomy, omentectomy, optimal tumor debulking including peritoneal stripping and removal of tumor along mesenteric and bowel surfaces, appendectomy, and IP port placement on 6/5/15. 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 Tumor was ER positive and VA negative.  - Repeat CA-125 was 281 on 7/14/15.  - Status post intraperitoneal carboplatin (AUC=6) and intravenous weekly paclitaxel 80 mg/m2 for 5 cycles from 7/14/15 to 10/28/15, followed by cycle 6 of carboplatin only on 11/4/15 due to a grade 2 rash requiring a Medrol  Dosepak.  - Genetics counseling was negative.  - CA-125 improved to 80 on 12/1/15 and a alina of 39 on 2/15/16 but never normalized.  - CT scan on 2/18/16 showed stable, small nodules in the mesentery favoring lymph nodes, and multiple unchanged lung nodules.  - CA-125 mara to 69 on 5/19/16 and CT scan on 5/27/16 showed no evidence of residual or recurrent malignant disease, with 3 small pulmonary nodules appearing stable since 11/25/15.   - CA-125 mara to 88 on 6/29/16 and CT scan on 8/15/16 was negative for clear evidence of recurrent disease.  - CA-125 mara to 156 on 9/27/16. PET/CT scan on 10/11/16 showed no evidence of FDG avid recurrent/residual disease, with mild focal hypermetabolic activity at the GE junction without definite CT correlate (most likely physiologic or inflammatory) and mildly prominent right external iliac chain lymph nodes that were stable in size since at least 5/22/15 and demonstrated background level uptake.  - CA-125 mara to 236 on 12/7/16 and CT scan on 1/9/17 showed no pelvic mass or adenopathy, with a new 6-mm area of nodularity in the medial right middle lobe and other tiny nodules, unchanged.  - CA-125 mara to 320 on 2/23/17. PET/CT scan on 3/7/17 showed the right external iliac lymph nodes to be slightly more conspicuous when compared to prior exams, with mildly increased tracer uptake consistent with metastasis given the increasing CA-125. There is likely low volume metastatic involvement of the right common iliac chain lymph nodes, and bilateral borderline enlarged (likely reactive) upper cervical lymph nodes with mild tracer uptake, similar to the prior.  - At Gyn Case Conference discussion on 3/16/17, recommendations were made for biopsy, MUGA scan, and resuming treatment with either platinum-based chemotherapy or enrollment in a randomized controlled clinical trial.   - Right iliac lymph node core biopsy on 3/21/17 confirmed metastasis from the patient's serous carcinoma.   -  MUGA scan on 3/23/17 was normal with left ventricular ejection fraction of 51%  - Deferred enrollment in GOG 0281 trametinib trial (which would have required another open surgery for frozen section biopsy).   - CA-125 mara to 430 on 3/29/17  - Began anastrozole 1 mg PO daily on 4/3/17  - CT scan on 5/2/17 showed progression of disease with interval development of peritoneal nodularity on the left extending from the spleen into the pelvis, small free fluid in the abdomen and pelvis, increase in right external iliac lymphadenopathy from 11 to 13 mm, and interval development of trace left pleural effusion.   - CA-125 mara to 545 on 5/2/17  - Discontinued anastrozole and began carboplatin (AUC=5) plus Doxil 30 mg/m2 with cycle 1 on 5/8/17   - CA-125 improved to 539 on 6/5/17 and continued with cycle 2 of carbo-Doxil on 6/5/17  - Diffuse rash developed 6/23/17  - CA-125 mara to 583 on 6/29/17 and continued with cycle 3 of carbo-Doxil on 6/29/17, rash resolved with topical triamcinolone  - CT scan of the chest, abdomen, and pelvis on 7/12/17 showed 2 unchanged sub-centimeter lung nodules with interval resolution of the right middle lobe nodule, decreased conspicuity in strand-like increased attenuation at the lower pole of the spleen with stable extension into the paracolic gutter with no new stranding or nodularity, no significant change in the mild right external iliac adenopathy (largest 13 mm) and sub-centimeter nodes along the right common iliac artery, and decrease in small amount of free fluid in the pelvis.  - CA-125 improved to 498 on 7/27/17, continued with cycle 4 of carbo-Doxil on 7/27 with no recurrence of rash  - CA-125 improved to 445 on 8/24/17, continued with cycle 5 on 8/24  - CA-125 improved to 343 on 9/28/17, continued with cycle 6 on 9/28  - CA-125 improved to 298 on 10/26/17, continued with cycle 7 on 10/26  - CA-125 improved to 283 on 11/16/17, continued with cycle 8 on 11/16  - CA-125 improved to  260 on 12/14/17, continued with cycle 9 on 12/14  - CA-125 improved to 223 on 1/11/18, continued with cycle 10 on 1/11  - CT scan of the chest, abdomen, and pelvis on 1/31/18 showed 2 unchanged lung nodules (4 mm right midlung, near the minor fissure, 5 mm subpleural nodule involving the lingula), no additional or new lung nodules, unchanged round, 1 cm cyst in the upper pole the left kidney, slight prominence of periaortic lymph nodes measuring up to 7 mm (unchanged), and slight diminution of a mildly enlarged right external iliac chain lymph node to 10 mm from 12 mm.   - Continued Doxil-carboplatin with cycle 11 on 2/8/18 (when CA-125 mara to 231) and cycle 12 on 3/8/18, when CA-125 mara to 246.   - Repeat CT scan on 3/27/18 showed no evidence of new metastatic disease in the chest, abdomen and pelvis with stable scattered tiny pulmonary nodules and stable retroperitoneal and mild pelvic lymphadenopathy.  - CA-125 improved to 208 on 4/5/18 when she received cycle 13.  - CA-125 improved to 188 on 5/3/18 when she received cycle 14  - Transthoracic echocardiogram on 5/8/18 was normal with left ventricular ejection fraction 60%  - CA-125 improved to 175 on 5/31/18 when she completed Doxil-carbo with cycle 15  - CA-125 was 165 on 6/21/18  - CT scan on 6/28/18 showed no evidence of recurrent disease or lymphadenopathy with stable lung nodules  - Began niraparib (Zejula) 200 mg PO daily on 6/28/18  - CA-125 was 184 on 7/5 and 7/18/18  - CA-125 was 179 on 8/20/18  - CT scan on 8/22/18 showed stable lung nodules measuring up to 5 mm, a stable 10 mm right iliac chain lymph node, and no developing adenopathy in the chest, abdomen, or pelvis.  - CA-125 mara to 234 on 10/4/18  - CT scan on 10/12/18 showed unchanged lung nodules (5 mm lingular pulmonary nodule and 4 mm right upper lobe nodule) with no new lesions and no recurrent disease or lymphadenopathy suspected in the chest, abdomen, or pelvis.  - CA-125 mara to 252 on  10/18/18 and 297 on 11/28/18  - CT scan on 12/27/18 showed similar right pelvic lymphadenopathy and stable occasional small pulmonary parenchymal nodule with no evidence of progressive disease  - Discussed at Gyn Case Conference on 1/3/19 with recommendation to either continue niraparib or switch to bevacizumab-cyclophosphamide   - CA-125 mara to 359 on 1/10/19  - Titrated niraparib from 200 to 300 mg PO qhs on 1/11/19  - CA-125 mara to 509 on 2/21/19.   - CT scan on 3/1/19 showed mildly increasing lower abdominal aortocaval and right pelvic external iliac and obturator adenopathy.  - Discontinued niraparib on 3/5/19  - CA-125 mara to 537 on 3/7/19  - Began bevacizumab-cyclophosphamide on 3/7/19  - CA-125 improved to 402 on 3/28/19  - Continued with cycle 2 of Avastin-Cytoxan on 3/28/19  - CA-125 improved to 359 on 4/18/19  - Continued with cycle 3 of Avastin-Cytoxan on 4/18/19  - CA-125 was 361 on 5/9/19   - Continued with cycle 4 of Avastin-Cytoxan on 5/9/19  - CA-125 mara to 376 on 5/30/19   - Continued with cycle 5 of Avastin-Cytoxan on 5/30/19  - CT scan of the neck, chest, abdomen and pelvis on 6/11/19 showed stable to improved cervical lymphadenopathy (largest right, 7 mm), stable subcentimeter lung nodules, shrinkage of a right common iliac artery lymph node to 12 mm (from 18 mm), another iliac lymph node stable at 7.8 mm, and other stable murphy-aortic lymph nodes.  - Brain MRI on 6/11/19 was negative for abnormal enhancement or other evidence of metastatic disease but showed an empty sella with a 2 cm polyp or inclusion cyst involving the inferior right maxillary sinus and a moderate amount of scattered deep white matter changes   - CA-125 was 356 on 6/20/19   - Continued with cycle 6 of Avastin-Cytoxan on 6/20/19  - CA-125 mara to 536 on 7/11/19  - Continued with cycle 7 of Avastin-Cytoxan on 7/11/19  - CA-125 was 502 on 8/1/19   - Continued with cycle 8 of Avastin-Cytoxan on 8/1/19  - CA-125 was 402 on  8/22/19   - Continued with cycle 9 of Avastin-Cytoxan on 8/22/19  - CA-125 was 447 on 9/12/19   - Continued with cycle 10 of Avastin-Cytoxan on 9/12/19   - CA-125 was 464 on 10/3/19   - Continued with cycle 11 of Avastin-Cytoxan on 10/3/19  - CT scan on 10/17/19 showed mixed response (slight progression by 2 mm in a few lymph nodes and improvement by 1 mm in one other lymph node)  - Continued Avastin-Cytoxan with cycle 12 on 10/22/19 and cycle 13 on 11/12/19  - Break in treatment due to loss of insurance  - CA-125 mara to 560 on 12/5/19  - Resumed Avastin-Cytoxan with cycle 14 on 12/5/19  2. 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 Pancytopenia  - Due to chemotherapy  - Stable low-normal blood counts  4. History of genital lesions  - Lesions over the mons pubis reported 11/16/17  - Verrucous appearance status post imiquimod with resolution  5. History of rash  - Diffuse, erythematous, non-pruritic, non-tender, macular rash with no associated symptoms.   - Onset on 6/23/17 likely due to anthracycline  - Slight worsening after cycle 3 of Doxil-carbo  - Resolved with triamcinolone cream and did not recur thereafter       PLAN:  1. Labs, imaging, and pathology were personally reviewed with the patient.  2. Discussed the pathogenesis, natural history, prognosis, and management options for recurrent ovarian cancer. She has maintained a good quality of life on bevacizumab-cyclophosphamide with mixed radiographic and biochemical response.   3. After careful explanation of all possible risks, potential benefits, and likely side effects, the patient has agreed to  continue cyclophosphamide 50 mg PO days 1-14 plus bevacizumab 15 mg/kg IV on day 1, both repeated every 21 days until progression or intolerance [Future Oncol 2015 Sep;11(18):4372-40]. Today (12/27/19) is cycle 15.   4. Discussed oral chemotherapy adherence and side effects with patient.  Patient is taking medication as prescribed.   5. No supplemental IV fluids today  6. Will continue to check urinalysis (large leukocyte esterase and few bacteria) but follow up urine culture rather than prescribe antibiotics (likely colonization given asymptomatic and negative urine cultures).  7. Best supportive measures:  - Writing prescription for bilateral compression stockings  - Will monitor for worsening arthralgias, myalgias, and/or proteinuria or hypertension  - Continue magnesium gluconate 500 mg PO BID  - Continue tramadol 50 mg PO q6h prn pain (#50 given on 7/11/19)  - Ok to use acetaminophen 650 mg PO q6h prn pain but advised not to exceed 2 grams per 24 hours  - Has ondansetron and prochlorperazine as needed for nausea  - Has imiquimod (Aldara) cream daily for lesions on mons pubis   - Has triamcinolone 0.1% ointment as needed for any recurrent skin rash.   - Continue aloe vera and skin moisturizers.  - Continue daily oral potassium.  - Advised frequent stops, ambulation, hydration, stretching during any long-haul travel.  - Previously filled out disability paperwork.  - She postponed gum surgery indefinitely (will have to be off Avastin for 6 weeks)  8. Continue apixaban 5 mg PO q12h [extrapolated from subgroup analysis of cancer patients on [facility] trials showing similar efficacy of rivaroxaban compared to LMWH/warfarin, Lancet Haematol 2014 Oct;1(1):e37-46 and extrapolated from N Engl J Med 2018;378:615-24 showing non-inferiority of edoxaban compared to dalteparin among 1050 cancer patients with venous thromboembolism].  9. Will update the patient with CA-125 level.  10. Return for follow-up with cycle 16 of  Avastin and labs (labs per treatment plan: CBC, CMP, Mg, urinalysis, CA-125) on 1/16/20.   11. Written informed consent signed and in the chart. I will continually assess the patient's tolerance of and response to this treatment, and make changes as necessary. Advised regular use of incentive spirometer, anti-emetics as needed, and regular physical activity with adequate oral hydration and well-balanced nutrition. All risks and benefits, along with all expected side effects and toxicities, were discussed in detail with the patient, who expresses understanding and agreement with the plan.        Counseling and coordination:    I have discussed my findings and further recommendations with the patient and answered all questions and she has verbalized understanding and is in agreement. I spent 20 minutes in face-to-face evaluation with the majority of the time being counseling and coordination, review of data and answering questions and discussions.      Thank you, Dr. Castillo, for allowing me to participate in the care of this patient. Please do not hesitate to page me at [phone] with any questions or concerns.    Diego Ventura MD

## 2020-11-13 NOTE — DISCHARGE NOTE PROVIDER - HOSPITAL COURSE
74 yo female advanced dementia presented with right hip fracture post fall.  Pre op anemia noted and hyponatremia, improved with PRBC transfusion and IV fluids.  Seen by orthopedics, underwent right hip ORIF and recommended 4 weeks of lovenox for dvt prophylaxis.  seen by palliative care and hospice, home hospice set up after discussion with family  post op anemia due to blood loss, improved with venofer.    stable for discharge to home hospice  dc planning 34 minutes.

## 2020-11-13 NOTE — BRIEF OPERATIVE NOTE - NSICDXBRIEFPREOP_GEN_ALL_CORE_FT
PRE-OP DIAGNOSIS:  Displaced spiral fracture of shaft of right femur, initial encounter for closed fracture 13-Nov-2020 17:41:00  Jorge L Noriega

## 2020-11-13 NOTE — BRIEF OPERATIVE NOTE - COMMENTS
post-op plan: WBAT, PT/OT, ancef per protocol, contralateral SCD, LMWH (or equivalent) x 4 weeks post-op, likely follow up PRN/telehealth due to comorbidities and COVID-19 pandemic

## 2020-11-13 NOTE — BRIEF OPERATIVE NOTE - TYPE OF ANESTHESIA
Discharge Planning Assessment  [facility]     Patient Name: Sam Love Jr.  MRN: [id number]  Today's Date: 10/11/2019    Admit Date: 10/10/2019    Discharge Needs Assessment     Row Name 10/11/19 1448       Living Environment    Lives With  spouse    Name(s) of Who Lives With Patient  spouse is Arthur Love    Current Living Arrangements  home/apartment/condo    Primary Care Provided by  self    Provides Primary Care For  no one    Family Caregiver if Needed  spouse    Quality of Family Relationships  supportive    Able to Return to Prior Arrangements  yes       Resource/Environmental Concerns    Resource/Environmental Concerns  none    Transportation Concerns  car, none       Transition Planning    Patient/Family Anticipates Transition to  home with family    Patient/Family Anticipated Services at Transition  none    Transportation Anticipated  family or friend will provide       Discharge Needs Assessment    Readmission Within the Last 30 Days  no previous admission in last 30 days    Concerns to be Addressed  discharge planning    Equipment Currently Used at Home  ramp;wound care supplies;commode unna boots, elevated commode  Has rolling walker and wheelchair but not currently using. He tells me he uses his cane when he is in his yard.    Anticipated Changes Related to Illness  none    Equipment Needed After Discharge  none    Outpatient/Agency/Support Group Needs  other (see comments) outpatient wound care on Mondays and Fridays at [facility]        Discharge Plan     Row Name 10/11/19 1719       Plan    Plan  Home with spouse    Patient/Family in Agreement with Plan  yes    Plan Comments  I met with patient and his son, Lj this afternoon. He is a pleasant 78 year old  man from [place]. He currently is cared for at [facility] for outpatient wound care (Unna boots). His wife provides transportation to his appointments.     Patient declines offer for home health and  prefers to do his services as outpatient.     Case management will continue to follow. Please consult our department if needs arise over the week end.     Megan SAMUELS RN, [facility] [phone]      Final Discharge Disposition Code  01 - home or self-care        Destination      No service coordination in this encounter.      Durable Medical Equipment      No service coordination in this encounter.      Dialysis/Infusion      No service coordination in this encounter.      Home Medical Care      No service coordination in this encounter.      Therapy      No service coordination in this encounter.      Community Resources      No service coordination in this encounter.          Demographic Summary     Row Name 10/11/19 1443       General Information    Admission Type  inpatient    Referral Source  admission list    Preferred Language  English    General Information Comments  PCP is Ricardo Greenberg       Contact Information    Permission Granted to Share Info With      Contact Information Comments  [phone]        Functional Status     Row Name 10/11/19 1446       Functional Status    Usual Activity Tolerance  good    Current Activity Tolerance  good       Functional Status, IADL    Medications  independent    Meal Preparation  independent    Housekeeping  independent    Laundry  assistive person    Shopping  independent    IADL Comments  tells me he can do all the above ADL. His wife does the laundry       Employment/    Employment/ Comments  Has [employer] Medicare. Denies concerns regarding coverage or disruption in coverage issues. He tells me he has no insurance coverage for medications, but has been able to self purchase        Psychosocial    No documentation.       Abuse/Neglect    No documentation.       Legal    No documentation.       Substance Abuse    No documentation.       Patient Forms    No documentation.           Megan Brennan RN     General

## 2020-11-13 NOTE — BRIEF OPERATIVE NOTE - NSICDXBRIEFPOSTOP_GEN_ALL_CORE_FT
POST-OP DIAGNOSIS:  Displaced spiral fracture of shaft of right femur, initial encounter for closed fracture 13-Nov-2020 17:41:05  Jorge L Noriega

## 2020-11-13 NOTE — DISCHARGE NOTE PROVIDER - CARE PROVIDER_API CALL
Jorge L Noriega)  Orthopaedic Surgery  [address] [place] [address]  Phone: [phone]  Fax: [phone]  Follow Up Time:

## 2020-11-14 LAB
ANION GAP SERPL CALC-SCNC: 9 MMOL/L — SIGNIFICANT CHANGE UP (ref 5–17)
BUN SERPL-MCNC: 17 MG/DL — SIGNIFICANT CHANGE UP (ref 8–20)
CALCIUM SERPL-MCNC: 8.7 MG/DL — SIGNIFICANT CHANGE UP (ref 8.6–10.2)
CHLORIDE SERPL-SCNC: 103 MMOL/L — SIGNIFICANT CHANGE UP (ref 98–107)
CO2 SERPL-SCNC: 23 MMOL/L — SIGNIFICANT CHANGE UP (ref 22–29)
CREAT SERPL-MCNC: 0.56 MG/DL — SIGNIFICANT CHANGE UP (ref 0.5–1.3)
GLUCOSE SERPL-MCNC: 109 MG/DL — HIGH (ref 70–99)
HCT VFR BLD CALC: 32.5 % — LOW (ref 34.5–45)
HGB BLD-MCNC: 10.8 G/DL — LOW (ref 11.5–15.5)
MCHC RBC-ENTMCNC: 30.9 PG — SIGNIFICANT CHANGE UP (ref 27–34)
MCHC RBC-ENTMCNC: 33.2 GM/DL — SIGNIFICANT CHANGE UP (ref 32–36)
MCV RBC AUTO: 92.9 FL — SIGNIFICANT CHANGE UP (ref 80–100)
PLATELET # BLD AUTO: 236 K/UL — SIGNIFICANT CHANGE UP (ref 150–400)
POTASSIUM SERPL-MCNC: 4.6 MMOL/L — SIGNIFICANT CHANGE UP (ref 3.5–5.3)
POTASSIUM SERPL-SCNC: 4.6 MMOL/L — SIGNIFICANT CHANGE UP (ref 3.5–5.3)
RBC # BLD: 3.5 M/UL — LOW (ref 3.8–5.2)
RBC # FLD: 14.7 % — HIGH (ref 10.3–14.5)
SODIUM SERPL-SCNC: 135 MMOL/L — SIGNIFICANT CHANGE UP (ref 135–145)
WBC # BLD: 5.42 K/UL — SIGNIFICANT CHANGE UP (ref 3.8–10.5)
WBC # FLD AUTO: 5.42 K/UL — SIGNIFICANT CHANGE UP (ref 3.8–10.5)

## 2020-11-14 PROCEDURE — 99232 SBSQ HOSP IP/OBS MODERATE 35: CPT

## 2020-11-14 RX ADMIN — Medication 100 MILLIGRAM(S): at 06:06

## 2020-11-14 RX ADMIN — ENOXAPARIN SODIUM 40 MILLIGRAM(S): 100 INJECTION SUBCUTANEOUS at 11:11

## 2020-11-14 NOTE — PHYSICAL THERAPY INITIAL EVALUATION ADULT - GENERAL OBSERVATIONS, REHAB EVAL
Pt received in semi huerta position in bed. NAD and agreeable to PT evaluation. (+) IV. (+) pimafit (+) mayte lower extremity compression. Pt received in semi huerta position in bed. NAD and RN agreeable to PT evaluation. (+) IV. (+) primafit (+) mayte lower extremity compression.

## 2020-11-14 NOTE — PROGRESS NOTE ADULT - SUBJECTIVE AND OBJECTIVE BOX
[facility]    [id number]    patient seen and examined status post right femur IM nail, POD # 1. Patient is doing well and is comfortable. The patient's pain is controlled using the prescribed pain medications. The patient is participating in physical therapy. Denies nausea, vomiting, chest pain, shortness of breath, abdominal pain, LE N/T. No new complaints.    Vital Signs Last 24 Hrs  T(C): 37.5 (14 Nov 2020 07:56), Max: 37.5 (14 Nov 2020 07:56)  T(F): 99.5 (14 Nov 2020 07:56), Max: 99.5 (14 Nov 2020 07:56)  HR: 94 (14 Nov 2020 07:56) (70 - 99)  BP: 109/67 (14 Nov 2020 07:56) (109/67 - 139/76)  BP(mean): --  RR: 15 (14 Nov 2020 07:56) (12 - 18)  SpO2: 97% (14 Nov 2020 07:56) (96% - 100%)                          10.8   5.42  )-----------( 236      ( 14 Nov 2020 06:30 )             32.5     11-14    135  |  103  |  17.0  ----------------------------<  109<H>  4.6   |  23.0  |  0.56    Ca    8.7      14 Nov 2020 06:30  Mg     2.2     11-13    TPro  6.3<L>  /  Alb  3.5  /  TBili  0.7  /  DBili  x   /  AST  22  /  ALT  14  /  AlkPhos  64  11-12      MEDICATIONS  (STANDING):  enoxaparin Injectable 40 milliGRAM(s) SubCutaneous daily    MEDICATIONS  (PRN):  acetaminophen   Tablet .. 650 milliGRAM(s) Oral every 6 hours PRN Mild Pain (1 - 3)  HYDROmorphone  Injectable 0.5 milliGRAM(s) IV Push every 4 hours PRN Severe Pain (7 - 10)  oxyCODONE    IR 5 milliGRAM(s) Oral every 4 hours PRN Moderate Pain (4 - 6)  oxyCODONE    IR 10 milliGRAM(s) Oral every 4 hours PRN Severe Pain (7 - 10)      Physical exam: NAD, confused secondary to hx advanced dementia  RLE:  The  dressing is clean, dry and intact. No drainage or discharge. No erythema is noted. No blistering. No ecchymosis.   Gross motor intact: + dorsi/plantar flexion, + EHL  calves soft, NT b/l LE.   2+ dorsalis pedis pulse. Capillary refill is less than 2 seconds. No cyanosis.    Primary Orthopedic Assessment:  • s/p RIGHT femur IM nail, POD # 1  hx advanced demetia    Secondary  Orthopedic Assessment(s):   •     Secondary  Medical Assessment(s):   •     Plan:   DVT prophylaxis: Lovenox 40mg,  use of compression devices and ankle pumps while in bed  Continue physical therapy  Weightbearing as tolerated of the right lower extremity with assistance of a walker  Incentive spirometry encouraged  Pain control as clinically indicated  Discharge planning   ortho stable  continue care with primary team

## 2020-11-14 NOTE — PHYSICAL THERAPY INITIAL EVALUATION ADULT - ADDITIONAL COMMENTS
As per chart Pt is wheelchair bound, has a HHA 24/7 and needs 100 percent assistant with all ADLs pt a poor historian, as per chart Pt is wheelchair bound, has a HHA 24/7 and needs 100 percent assistant with all ADLs

## 2020-11-14 NOTE — PROGRESS NOTE ADULT - ASSESSMENT
76 yo female advanced dementia presented with right hip fracture post fall     right hip fracture: s/p IM nail by orthopedics    c/w dvt ppx lovenox daily x4 weeks    pain control/dvt ppx.     Hyponatremia likely secondary combination of decrease PO intake and low salt consumption         resolved post IVF     Normocytic anemia. H/h is 8.3/24 on admission, s/p 1 PRBC  prior to surgery     Severe dementia:    may be candidate for hospice care      Goals of care and advance directive: discussed goals of care and advance directives with pt brother - Warren and sister - Mona, pt is DNR/DNI.       palliative consulted.       VTE prophylaxis: Enoxaparin

## 2020-11-14 NOTE — PHYSICAL THERAPY INITIAL EVALUATION ADULT - LEVEL OF INDEPENDENCE: SIT/STAND, REHAB EVAL
40% task completed due to not following commands/maximum assist (25% patients effort) 40% task completed, unsafe to continue due to not following commands/maximum assist (25% patients effort)

## 2020-11-14 NOTE — PHYSICAL THERAPY INITIAL EVALUATION ADULT - CRITERIA FOR SKILLED THERAPEUTIC INTERVENTIONS
functional limitations in following categories/predicted duration of therapy intervention/risk reduction/prevention/impairments found therapy frequency/predicted duration of therapy intervention/risk reduction/prevention/impairments found/functional limitations in following categories/rehab potential/anticipated discharge recommendation

## 2020-11-14 NOTE — PROGRESS NOTE ADULT - SUBJECTIVE AND OBJECTIVE BOX
seen for dementia, right hip fracture    ros limited due to mental status  mumbling.    MEDICATIONS  (STANDING):  enoxaparin Injectable 40 milliGRAM(s) SubCutaneous daily    MEDICATIONS  (PRN):  acetaminophen   Tablet .. 650 milliGRAM(s) Oral every 6 hours PRN Mild Pain (1 - 3)  HYDROmorphone  Injectable 0.5 milliGRAM(s) IV Push every 4 hours PRN Severe Pain (7 - 10)  oxyCODONE    IR 5 milliGRAM(s) Oral every 4 hours PRN Moderate Pain (4 - 6)  oxyCODONE    IR 10 milliGRAM(s) Oral every 4 hours PRN Severe Pain (7 - 10)      Allergies    No Known Allergies    Vital Signs Last 24 Hrs  T(C): 37.5 (14 Nov 2020 07:56), Max: 37.5 (14 Nov 2020 07:56)  T(F): 99.5 (14 Nov 2020 07:56), Max: 99.5 (14 Nov 2020 07:56)  HR: 94 (14 Nov 2020 07:56) (70 - 99)  BP: 109/67 (14 Nov 2020 07:56) (109/67 - 139/76)  BP(mean): --  RR: 15 (14 Nov 2020 07:56) (12 - 16)  SpO2: 97% (14 Nov 2020 07:56) (96% - 100%)    PHYSICAL EXAM:    GENERAL: NAD  CHEST/LUNG: Clear to percussion bilaterally  HEART: Regular rate and rhythm; S1 S2  ABDOMEN: Soft, Bowel sounds present  EXTREMITIES:  no LE edema   NERVOUS SYSTEM:  Alert & Oriented X1 mumbling, difficult to understand    LABS:                        10.8   5.42  )-----------( 236      ( 14 Nov 2020 06:30 )             32.5     11-14    135  |  103  |  17.0  ----------------------------<  109<H>  4.6   |  23.0  |  0.56    Ca    8.7      14 Nov 2020 06:30  Mg     2.2     11-13    TPro  6.3<L>  /  Alb  3.5  /  TBili  0.7  /  DBili  x   /  AST  22  /  ALT  14  /  AlkPhos  64  11-12    PT/INR - ( 13 Nov 2020 06:53 )   PT: 12.4 sec;   INR: 1.07 ratio         PTT - ( 12 Nov 2020 13:45 )  PTT:25.6 sec      CAPILLARY BLOOD GLUCOSE            RADIOLOGY & ADDITIONAL TESTS:

## 2020-11-14 NOTE — PHYSICAL THERAPY INITIAL EVALUATION ADULT - ACTIVE RANGE OF MOTION EXAMINATION, REHAB EVAL
bilateral upper extremity Active ROM was WFL (within functional limits)/bilateral  lower extremity Active ROM was WFL (within functional limits)/except to Right LE 2/2 to hip fracture

## 2020-11-14 NOTE — PHYSICAL THERAPY INITIAL EVALUATION ADULT - MANUAL MUSCLE TESTING RESULTS, REHAB EVAL
>/= 3-/5 grossly Right lower extremity, Haider UE and Left LE >/= 3+/5 grossly unable to fully assess due to cognition

## 2020-11-14 NOTE — PHYSICAL THERAPY INITIAL EVALUATION ADULT - IMPAIRMENTS FOUND, PT EVAL
muscle strength/gait, locomotion, and balance muscle strength/aerobic capacity/endurance/arousal, attention, and cognition/gait, locomotion, and balance

## 2020-11-14 NOTE — PHYSICAL THERAPY INITIAL EVALUATION ADULT - PLANNED THERAPY INTERVENTIONS, PT EVAL
transfer training/strengthening/bed mobility training/ROM balance training/gait training/strengthening/bed mobility training/ROM/transfer training

## 2020-11-15 LAB
ANION GAP SERPL CALC-SCNC: 9 MMOL/L — SIGNIFICANT CHANGE UP (ref 5–17)
BUN SERPL-MCNC: 17 MG/DL — SIGNIFICANT CHANGE UP (ref 8–20)
CALCIUM SERPL-MCNC: 8.7 MG/DL — SIGNIFICANT CHANGE UP (ref 8.6–10.2)
CHLORIDE SERPL-SCNC: 102 MMOL/L — SIGNIFICANT CHANGE UP (ref 98–107)
CO2 SERPL-SCNC: 23 MMOL/L — SIGNIFICANT CHANGE UP (ref 22–29)
CREAT SERPL-MCNC: 0.51 MG/DL — SIGNIFICANT CHANGE UP (ref 0.5–1.3)
GLUCOSE SERPL-MCNC: 102 MG/DL — HIGH (ref 70–99)
HCT VFR BLD CALC: 25.9 % — LOW (ref 34.5–45)
HGB BLD-MCNC: 8.6 G/DL — LOW (ref 11.5–15.5)
MCHC RBC-ENTMCNC: 31.3 PG — SIGNIFICANT CHANGE UP (ref 27–34)
MCHC RBC-ENTMCNC: 33.2 GM/DL — SIGNIFICANT CHANGE UP (ref 32–36)
MCV RBC AUTO: 94.2 FL — SIGNIFICANT CHANGE UP (ref 80–100)
PLATELET # BLD AUTO: 220 K/UL — SIGNIFICANT CHANGE UP (ref 150–400)
POTASSIUM SERPL-MCNC: 3.8 MMOL/L — SIGNIFICANT CHANGE UP (ref 3.5–5.3)
POTASSIUM SERPL-SCNC: 3.8 MMOL/L — SIGNIFICANT CHANGE UP (ref 3.5–5.3)
RBC # BLD: 2.75 M/UL — LOW (ref 3.8–5.2)
RBC # FLD: 14.6 % — HIGH (ref 10.3–14.5)
SODIUM SERPL-SCNC: 134 MMOL/L — LOW (ref 135–145)
VIT B12 SERPL-MCNC: 1130 PG/ML — SIGNIFICANT CHANGE UP (ref 232–1245)
WBC # BLD: 4.45 K/UL — SIGNIFICANT CHANGE UP (ref 3.8–10.5)
WBC # FLD AUTO: 4.45 K/UL — SIGNIFICANT CHANGE UP (ref 3.8–10.5)

## 2020-11-15 PROCEDURE — 99232 SBSQ HOSP IP/OBS MODERATE 35: CPT

## 2020-11-15 PROCEDURE — 99497 ADVNCD CARE PLAN 30 MIN: CPT | Mod: 25

## 2020-11-15 PROCEDURE — 99222 1ST HOSP IP/OBS MODERATE 55: CPT

## 2020-11-15 RX ORDER — IRON SUCROSE 20 MG/ML
200 INJECTION, SOLUTION INTRAVENOUS EVERY 24 HOURS
Refills: 0 | Status: DISCONTINUED | OUTPATIENT
Start: 2020-11-15 | End: 2020-11-17

## 2020-11-15 RX ORDER — INFLUENZA VIRUS VACCINE 15; 15; 15; 15 UG/.5ML; UG/.5ML; UG/.5ML; UG/.5ML
0.5 SUSPENSION INTRAMUSCULAR ONCE
Refills: 0 | Status: DISCONTINUED | OUTPATIENT
Start: 2020-11-15 | End: 2020-11-17

## 2020-11-15 RX ADMIN — IRON SUCROSE 110 MILLIGRAM(S): 20 INJECTION, SOLUTION INTRAVENOUS at 11:52

## 2020-11-15 RX ADMIN — ENOXAPARIN SODIUM 40 MILLIGRAM(S): 100 INJECTION SUBCUTANEOUS at 11:53

## 2020-11-15 NOTE — CONSULT NOTE ADULT - ASSESSMENT
DNR/DNI  home hospice 74 yo female advanced dementia presented with right hip fracture post fall  s/p ORIF  by orthopedics. Palliative care consulted for GOC    1) Failure to thrive, functional decline, s/p fall with R hip fracture s/p ORIF, advanced vascular dementia with h/o CVA, dysphagia  2) Goals of care/advance care planning  - Palliative performance scale score: 20% (poor)  - Prognosis: days-weeks (pt is hospice eligible)   - Code status: DNR/DNI   - GOC: Home hospice upon discharge.   - HCP: pt's son Warren Marin  - Psychosocial support provided  - SW to start referral to home hospice    Appreciate consult. Discussed with the primary team.    Xu Sahni MD

## 2020-11-15 NOTE — PROGRESS NOTE ADULT - SUBJECTIVE AND OBJECTIVE BOX
Ortho Post Op Check    Name: YESSI WKAN    MR #: [id number]    Procedure: Right femur IM nail  Surgeon: Dr Noriega    PAST MEDICAL & SURGICAL HISTORY:  Advanced dementia    Anemia    No significant past surgical history    No significant past surgical history        Patient with advanced dementia. Patient unable to follow commands and is not oriented to self or place     General Exam:  Vital Signs Last 24 Hrs  T(C): 36.6 (11-15-20 @ 04:35), Max: 36.6 (11-15-20 @ 04:35)  T(F): 97.8 (11-15-20 @ 04:35), Max: 97.8 (11-15-20 @ 04:35)  HR: 76 (11-15-20 @ 04:35) (76 - 76)  BP: 116/67 (11-15-20 @ 04:35) (116/67 - 116/67)  BP(mean): --  RR: 18 (11-15-20 @ 04:35) (18 - 18)  SpO2: 96% (11-15-20 @ 04:35) (96% - 96%)    General: Pt Alert and oriented, NAD, controlled pain.  Right hip and thigh dressings removed to reveal both proximal and distal incisions are C/D/I. No bleeding.  Pulses: 1+ dorsalis pedis pulse. Cap refill < 2 sec.  Calf soft, supple and nontender                             8.6    4.45  )-----------( 220      ( 15 Nov 2020 05:18 )             25.9   15 Nov 2020 05:18    134    |  102    |  17.0   ----------------------------<  102    3.8     |  23.0   |  0.51     Ca    8.7        15 Nov 2020 05:18      MEDICATIONS  (STANDING):  enoxaparin Injectable 40 milliGRAM(s) SubCutaneous daily    MEDICATIONS  (PRN):  acetaminophen   Tablet .. 650 milliGRAM(s) Oral every 6 hours PRN Mild Pain (1 - 3)  HYDROmorphone  Injectable 0.5 milliGRAM(s) IV Push every 4 hours PRN Severe Pain (7 - 10)  oxyCODONE    IR 5 milliGRAM(s) Oral every 4 hours PRN Moderate Pain (4 - 6)  oxyCODONE    IR 10 milliGRAM(s) Oral every 4 hours PRN Severe Pain (7 - 10)      A/P: 75yFemale POD#2 s/p Right proximal femur fx now s/p IM nail right femur POD#2   - Stable  - Pain Control  - DVT ppx: Lovenox   - Weight bearing status: WBAT  - Drop in H&H. Ortho recommends transfusion prior to discharge if ok with medical team

## 2020-11-15 NOTE — PROGRESS NOTE ADULT - SUBJECTIVE AND OBJECTIVE BOX
seen for hip fracture, dementia    ros limited due to dementia      MEDICATIONS  (STANDING):  enoxaparin Injectable 40 milliGRAM(s) SubCutaneous daily  iron sucrose IVPB 200 milliGRAM(s) IV Intermittent every 24 hours    MEDICATIONS  (PRN):  acetaminophen   Tablet .. 650 milliGRAM(s) Oral every 6 hours PRN Mild Pain (1 - 3)  HYDROmorphone  Injectable 0.5 milliGRAM(s) IV Push every 4 hours PRN Severe Pain (7 - 10)  oxyCODONE    IR 5 milliGRAM(s) Oral every 4 hours PRN Moderate Pain (4 - 6)      Allergies    No Known Allergies    Vital Signs Last 24 Hrs  T(C): 36.6 (15 Nov 2020 04:35), Max: 37.4 (14 Nov 2020 19:38)  T(F): 97.8 (15 Nov 2020 04:35), Max: 99.3 (14 Nov 2020 19:38)  HR: 76 (15 Nov 2020 04:35) (76 - 107)  BP: 116/67 (15 Nov 2020 04:35) (103/62 - 116/67)  BP(mean): --  RR: 18 (15 Nov 2020 04:35) (16 - 18)  SpO2: 96% (15 Nov 2020 04:35) (96% - 99%)    PHYSICAL EXAM:    GENERAL: NAD  CHEST/LUNG: Clear to percussion bilaterally  HEART: Regular rate and rhythm; S1 S2  ABDOMEN: Soft, ,Bowel sounds present  EXTREMITIES:  no edema  right hip incision dressing c/d/i  NERVOUS SYSTEM:  Alert, follows simple commands  confused. minimally verbal    LABS:                        8.6    4.45  )-----------( 220      ( 15 Nov 2020 05:18 )             25.9     11-15    134<L>  |  102  |  17.0  ----------------------------<  102<H>  3.8   |  23.0  |  0.51    Ca    8.7      15 Nov 2020 05:18            CAPILLARY BLOOD GLUCOSE            RADIOLOGY & ADDITIONAL TESTS:

## 2020-11-15 NOTE — PROGRESS NOTE ADULT - ASSESSMENT
74 yo female advanced dementia presented with right hip fracture post fall     right hip fracture: s/p ORIF  by orthopedics    c/w dvt ppx lovenox daily x4 weeks    pain control/dvt ppx.     post op anemia: will trend,  PRBC if hg <8     will given venofer     s/p1 U prior to surgery     Hyponatremia likely secondary combination of decrease PO intake and low salt consumption         resolved post IVF     advanced dementia:    may be candidate for hospice care      prior hospitalist conversation:  Goals of care and advance directive: discussed goals of care and advance directives with pt brother - Warren and sister - Mona, pt is DNR/DNI.       palliative consulted.         76 yo female advanced dementia presented with right hip fracture post fall     right hip fracture: s/p ORIF  by orthopedics    c/w dvt ppx lovenox daily x4 weeks    pain control/dvt ppx.     post op anemia: will trend,  PRBC if hg <8     will given venofer     s/p1 U prior to surgery     Hyponatremia likely secondary combination of decrease PO intake and low salt consumption         resolved post IVF     advanced dementia:    may be candidate for hospice care      prior hospitalist conversation:  Goals of care and advance directive: discussed goals of care and advance directives with pt brother - Warren and sister - Mona, pt is DNR/DNI.       d/w palliative     DNR/DNI and home hospice

## 2020-11-15 NOTE — CONSULT NOTE ADULT - SUBJECTIVE AND OBJECTIVE BOX
HPI:  76 yo female from home, has 24 hr HHA, wheelchair bound, non verbal with PMHs of severe dementia brought for right hip fracture. Last Sunday she fell from chair, did not appear to be in pain. Two days later aid noted swollen right LE with inversion. Brother took her with ambulance to get XRAY and was told that she has a hip fracture. Brother called orthopedist - Dr. Granado who recommended to have surgery.   Per brother and sister at bedside, they want her to have surgery to have some quality of life, be pain free " until she dies"   She needs 100 % assist with ADL, including feeding.  (12 Nov 2020 16:52)    PERTINENT PM/SXH:   Advanced dementia    Anemia    No pertinent past medical history      No significant past surgical history    No significant past surgical history      FAMILY HISTORY:  FH: hypertension      ITEMS NOT CHECKED ARE NOT PRESENT    SOCIAL HISTORY:   Significant other/partner[ ]  Children[ ]  Yarsanism/Spirituality:  Substance hx:  [ ]   Tobacco hx:  [ ]   Alcohol hx: [ ]   Home Opioid hx:  [ ] I-Stop Reference No:  Living Situation: [ ]Home  [ ]Long term care  [ ]Rehab [ ]Other    ADVANCE DIRECTIVES:    DNR  Yes    MOLST  [ ]  Living Will  [ ]   DECISION MAKER(s):  [ ] Health Care Proxy(s)  [ ] Surrogate(s)  [ ] Guardian           Name(s): Phone Number(s):    BASELINE (I)ADL(s) (prior to admission):  [place]: [ ]Total  [ ] Moderate [ ]Dependent    Allergies    No Known Allergies    Intolerances    MEDICATIONS  (STANDING):  enoxaparin Injectable 40 milliGRAM(s) SubCutaneous daily  iron sucrose IVPB 200 milliGRAM(s) IV Intermittent every 24 hours    MEDICATIONS  (PRN):  acetaminophen   Tablet .. 650 milliGRAM(s) Oral every 6 hours PRN Mild Pain (1 - 3)  HYDROmorphone  Injectable 0.5 milliGRAM(s) IV Push every 4 hours PRN Severe Pain (7 - 10)  oxyCODONE    IR 5 milliGRAM(s) Oral every 4 hours PRN Moderate Pain (4 - 6)  oxyCODONE    IR 10 milliGRAM(s) Oral every 4 hours PRN Severe Pain (7 - 10)    PRESENT SYMPTOMS: [x ]Unable to obtain due to poor mentation   Source if other than patient:  [ ]Family   [ ]Team     Pain: [ ]yes [ ]no  QOL impact -   Location -                    Aggravating factors -  Quality -  Radiation -  Timing-  Severity (0-10 scale):  Minimal acceptable level (0-10 scale):     CPOT:    https://www.[facility].org/getattachment/[email or web address]/Critical-Care-Pain-Observation-Tool-(CPOT)      PAIN AD Score:     http://geriatrictoolkit.[facility]/cog/painad.pdf (press ctrl +  left click to view)    Dyspnea:                           [ ]Mild [ ]Moderate [ ]Severe  Anxiety:                             [ ]Mild [ ]Moderate [ ]Severe  Fatigue:                             [ ]Mild [ ]Moderate [ ]Severe  Nausea:                             [ ]Mild [ ]Moderate [ ]Severe  Loss of appetite:              [ ]Mild [ ]Moderate [ ]Severe  Constipation:                    [ ]Mild [ ]Moderate [ ]Severe    Other Symptoms:  [ ]All other review of systems negative     Palliative Performance Status Version 2:        20 %    http://[facility].org/files/news/palliative_performance_scale_ppsv2.pdf  PHYSICAL EXAM:  Vital Signs Last 24 Hrs  T(C): 36.6 (15 Nov 2020 04:35), Max: 37.4 (14 Nov 2020 19:38)  T(F): 97.8 (15 Nov 2020 04:35), Max: 99.3 (14 Nov 2020 19:38)  HR: 76 (15 Nov 2020 04:35) (76 - 107)  BP: 116/67 (15 Nov 2020 04:35) (103/62 - 116/67)  BP(mean): --  RR: 18 (15 Nov 2020 04:35) (16 - 18)  SpO2: 96% (15 Nov 2020 04:35) (96% - 99%) I&O's Summary    14 Nov 2020 07:01  -  15 Nov 2020 07:00  --------------------------------------------------------  IN: 0 mL / OUT: 840 mL / NET: -840 mL    15 Nov 2020 07:01  -  15 Nov 2020 10:55  --------------------------------------------------------  IN: 300 mL / OUT: 0 mL / NET: 300 mL        LABS:                        8.6    4.45  )-----------( 220      ( 15 Nov 2020 05:18 )             25.9   11-15    134<L>  |  102  |  17.0  ----------------------------<  102<H>  3.8   |  23.0  |  0.51    Ca    8.7      15 Nov 2020 05:18          RADIOLOGY & ADDITIONAL STUDIES: reviewed    PROTEIN CALORIE MALNUTRITION PRESENT: [ ]mild [ ]moderate [ ]severe [ ]underweight [ ]morbid obesity  https://www.andeal.org/vault/2440/web/files/ONC/Table_Clinical%20Characteristics%20to%20Document%20Malnutrition-White%20JV%20et%20al%774194.pdf    Height (cm): 152.6 (11-12-20 @ 12:25)  Weight (kg): 67.993 (11-12-20 @ 12:25)  BMI (kg/m2): 29.2 (11-12-20 @ 12:25)    [ ]PPSV2 < or = to 30% [ ]significant weight loss  [ ]poor nutritional intake  [ ]anasarca     Albumin, Serum: 3.5 g/dL (11-12-20 @ 13:45)   [ ]Artificial Nutrition      REFERRALS:   [ ]Chaplaincy  [ ]Hospice  [ ]Child Life  [ ]Social Work  [ ]Case management [ ]Holistic Therapy     Goals of Care Document: HPI:  74 yo female from home, has 24 hr HHA, wheelchair bound, non verbal with PMHs of severe dementia brought for right hip fracture. Last Sunday she fell from chair, did not appear to be in pain. Two days later aid noted swollen right LE with inversion. Brother took her with ambulance to get XRAY and was told that she has a hip fracture. Brother called orthopedist - Dr. Granado who recommended to have surgery.   Per brother and sister at bedside, they want her to have surgery to have some quality of life, be pain free " until she dies"   She needs 100 % assist with ADL, including feeding.  (12 Nov 2020 16:52)    PERTINENT PM/SXH:   Advanced dementia    Anemia    No pertinent past medical history      No significant past surgical history    No significant past surgical history      FAMILY HISTORY:  FH: hypertension      ITEMS NOT CHECKED ARE NOT PRESENT    SOCIAL HISTORY:   Significant other/partner[ ]  Children[x ]  Mormon/Spirituality:  Substance hx:  [ ]   Tobacco hx:  [ ]   Alcohol hx: [ ]   Home Opioid hx:  [ ] I-Stop Reference No:  Living Situation: [x ]Home  [ ]Long term care  [ ]Rehab [ ]Other    ADVANCE DIRECTIVES:    DNR  Yes    MOLST  [x ]  Living Will  [ ]   DECISION MAKER(s):  [x ] Health Care Proxy(s)  [ ] Surrogate(s)  [ ] Guardian           Name(s): pt's son Warren Marin  Phone Number(s): see chart    BASELINE (I)ADL(s) (prior to admission):  [place]: [ ]Total  [ ] Moderate [ x]Dependent    Allergies    No Known Allergies    Intolerances    MEDICATIONS  (STANDING):  enoxaparin Injectable 40 milliGRAM(s) SubCutaneous daily  iron sucrose IVPB 200 milliGRAM(s) IV Intermittent every 24 hours    MEDICATIONS  (PRN):  acetaminophen   Tablet .. 650 milliGRAM(s) Oral every 6 hours PRN Mild Pain (1 - 3)  HYDROmorphone  Injectable 0.5 milliGRAM(s) IV Push every 4 hours PRN Severe Pain (7 - 10)  oxyCODONE    IR 5 milliGRAM(s) Oral every 4 hours PRN Moderate Pain (4 - 6)  oxyCODONE    IR 10 milliGRAM(s) Oral every 4 hours PRN Severe Pain (7 - 10)    PRESENT SYMPTOMS: [x ]Unable to obtain due to poor mentation   Source if other than patient:  [ ]Family   [ ]Team     Pain: [ ]yes [ ]no  QOL impact -   Location -                    Aggravating factors -  Quality -  Radiation -  Timing-  Severity (0-10 scale):  Minimal acceptable level (0-10 scale):     CPOT:    https://www.[facility].org/getattachment/[email or web address]-9h8r-[email or web address]-3887a7985l1r/Critical-Care-Pain-Observation-Tool-(CPOT)      PAIN AD Score: 0 (zero)    http://geriatrictoolkit.[facility]/cog/painad.pdf (press ctrl +  left click to view)    Dyspnea:                           [ ]Mild [ ]Moderate [ ]Severe  Anxiety:                             [ ]Mild [ ]Moderate [ ]Severe  Fatigue:                             [ ]Mild [ ]Moderate [ ]Severe  Nausea:                             [ ]Mild [ ]Moderate [ ]Severe  Loss of appetite:              [ ]Mild [ ]Moderate [ ]Severe  Constipation:                    [ ]Mild [ ]Moderate [ ]Severe    Other Symptoms:  [ ]All other review of systems negative     Palliative Performance Status Version 2:        20 %    http://npcrc.org/files/news/palliative_performance_scale_ppsv2.pdf  PHYSICAL EXAM:  Vital Signs Last 24 Hrs  T(C): 36.6 (15 Nov 2020 04:35), Max: 37.4 (14 Nov 2020 19:38)  T(F): 97.8 (15 Nov 2020 04:35), Max: 99.3 (14 Nov 2020 19:38)  HR: 76 (15 Nov 2020 04:35) (76 - 107)  BP: 116/67 (15 Nov 2020 04:35) (103/62 - 116/67)  BP(mean): --  RR: 18 (15 Nov 2020 04:35) (16 - 18)  SpO2: 96% (15 Nov 2020 04:35) (96% - 99%) I&O's Summary    14 Nov 2020 07:01  -  15 Nov 2020 07:00  --------------------------------------------------------  IN: 0 mL / OUT: 840 mL / NET: -840 mL    15 Nov 2020 07:01  -  15 Nov 2020 10:55  --------------------------------------------------------  IN: 300 mL / OUT: 0 mL / NET: 300 mL    GENERAL: NAD, lying in bed  HEENT: NCAT, no jaundice, MMM  CHEST/LUNG: Clear to percussion bilaterally  HEART: Regular rate and rhythm; S1 S2  ABDOMEN: Soft, ,Bowel sounds present  EXTREMITIES:  no edema  right hip incision dressing c/d/i  NERVOUS SYSTEM:  Alert, follows simple commands, mumbles  Psych: flat affect     LABS:                        8.6    4.45  )-----------( 220      ( 15 Nov 2020 05:18 )             25.9   11-15    134<L>  |  102  |  17.0  ----------------------------<  102<H>  3.8   |  23.0  |  0.51    Ca    8.7      15 Nov 2020 05:18          RADIOLOGY & ADDITIONAL STUDIES: reviewed    PROTEIN CALORIE MALNUTRITION PRESENT: [ ]mild [ ]moderate [ ]severe [ ]underweight [ ]morbid obesity  https://www.andeal.org/vault/2440/web/files/ONC/Table_Clinical%20Characteristics%20to%20Document%20Malnutrition-White%20JV%20et%20al%202012.pdf    Height (cm): 152.6 (11-12-20 @ 12:25)  Weight (kg): 67.993 (11-12-20 @ 12:25)  BMI (kg/m2): 29.2 (11-12-20 @ 12:25)    [ x]PPSV2 < or = to 30% [ ]significant weight loss  [ ]poor nutritional intake  [ ]anasarca     Albumin, Serum: 3.5 g/dL (11-12-20 @ 13:45)   [ ]Artificial Nutrition      REFERRALS:   [ ]Chaplaincy  [ x]Hospice  [ ]Child Life  [ ]Social Work  [ ]Case management [ ]Holistic Therapy     Goals of Care Document:

## 2020-11-16 LAB
ANION GAP SERPL CALC-SCNC: 8 MMOL/L — SIGNIFICANT CHANGE UP (ref 5–17)
BUN SERPL-MCNC: 12 MG/DL — SIGNIFICANT CHANGE UP (ref 8–20)
CALCIUM SERPL-MCNC: 8.9 MG/DL — SIGNIFICANT CHANGE UP (ref 8.6–10.2)
CHLORIDE SERPL-SCNC: 102 MMOL/L — SIGNIFICANT CHANGE UP (ref 98–107)
CO2 SERPL-SCNC: 24 MMOL/L — SIGNIFICANT CHANGE UP (ref 22–29)
CREAT SERPL-MCNC: 0.49 MG/DL — LOW (ref 0.5–1.3)
GLUCOSE SERPL-MCNC: 93 MG/DL — SIGNIFICANT CHANGE UP (ref 70–99)
HCT VFR BLD CALC: 27.8 % — LOW (ref 34.5–45)
HGB BLD-MCNC: 9.1 G/DL — LOW (ref 11.5–15.5)
MCHC RBC-ENTMCNC: 31.3 PG — SIGNIFICANT CHANGE UP (ref 27–34)
MCHC RBC-ENTMCNC: 32.7 GM/DL — SIGNIFICANT CHANGE UP (ref 32–36)
MCV RBC AUTO: 95.5 FL — SIGNIFICANT CHANGE UP (ref 80–100)
PLATELET # BLD AUTO: 254 K/UL — SIGNIFICANT CHANGE UP (ref 150–400)
POTASSIUM SERPL-MCNC: 4.1 MMOL/L — SIGNIFICANT CHANGE UP (ref 3.5–5.3)
POTASSIUM SERPL-SCNC: 4.1 MMOL/L — SIGNIFICANT CHANGE UP (ref 3.5–5.3)
RBC # BLD: 2.91 M/UL — LOW (ref 3.8–5.2)
RBC # FLD: 14.7 % — HIGH (ref 10.3–14.5)
SODIUM SERPL-SCNC: 134 MMOL/L — LOW (ref 135–145)
WBC # BLD: 4.06 K/UL — SIGNIFICANT CHANGE UP (ref 3.8–10.5)
WBC # FLD AUTO: 4.06 K/UL — SIGNIFICANT CHANGE UP (ref 3.8–10.5)

## 2020-11-16 PROCEDURE — 99233 SBSQ HOSP IP/OBS HIGH 50: CPT

## 2020-11-16 PROCEDURE — 99232 SBSQ HOSP IP/OBS MODERATE 35: CPT

## 2020-11-16 RX ORDER — LIDOCAINE 4 G/100G
1 CREAM TOPICAL DAILY
Refills: 0 | Status: DISCONTINUED | OUTPATIENT
Start: 2020-11-16 | End: 2020-11-17

## 2020-11-16 RX ADMIN — LIDOCAINE 1 PATCH: 4 CREAM TOPICAL at 12:41

## 2020-11-16 RX ADMIN — ENOXAPARIN SODIUM 40 MILLIGRAM(S): 100 INJECTION SUBCUTANEOUS at 11:22

## 2020-11-16 RX ADMIN — IRON SUCROSE 110 MILLIGRAM(S): 20 INJECTION, SOLUTION INTRAVENOUS at 12:41

## 2020-11-16 NOTE — PROGRESS NOTE ADULT - ASSESSMENT
74 yo female advanced dementia presented with right hip fracture post fall     right hip fracture: s/p ORIF  by orthopedics    c/w dvt ppx lovenox daily x4 weeks    pain control/dvt ppx.     post op anemia: will trend,  PRBC if hg <8     will give venofer     s/p1 U prior to surgery     Hyponatremia likely secondary combination of decrease PO intake and low salt consumption         resolved post IVF     advanced dementia:    may be candidate for hospice care      prior hospitalist conversation:  Goals of care and advance directive: discussed goals of care and advance directives with pt brother - Warren and sister - Mona, pt is DNR/DNI.       d/w palliative     DNR/DNI and home hospice          DISPO: dc in am with home hospice, RN instructed to teach family how to inject lovenox.

## 2020-11-16 NOTE — PROGRESS NOTE ADULT - REASON FOR ADMISSION
Hip fracture
pre op
Hip fracture

## 2020-11-16 NOTE — PROGRESS NOTE ADULT - ASSESSMENT
75yr woman, hx of CVA, dementia - WC bound, s/p R IMN for hip fracture    1. Hip fracture -   s/p IMN  Pain control - Lidocaine patch to area  Cont PRN Oxycodone    2.  Dementia- advanced. Patient is wheel chair bound at baseline, needs assistance in all ADLs  Assist in care      3. Dysphagia - modified diet    4. Anemia-  Hg stable    5.  Encounter for Palliative Care  Patient with advanced dementia.  Given hip fracture, will likely have further decline in baseline functional status  Hospice referral for home services

## 2020-11-16 NOTE — CDI QUERY NOTE - NSCDIOTHERTXTBX_GEN_ALL_CORE_HH
SUPPORTING DOCUMENTATION / EVIDENCE:  admitted s/p fall from wheelchair  found to have Rt hip fx, s/p IM nailing on 11/13  post-op anemia documented by attending    HGB TREND:  Hemoglobin: 9.1 g/dL <L> [11.5 - 15.5] (11-16-20)  Hemoglobin: 8.6 g/dL <L> [11.5 - 15.5] (11-15-20)  Hemoglobin: 10.8 g/dL <L> [11.5 - 15.5] (11-14-20)  Hemoglobin: 9.4 g/dL <L> [11.5 - 15.5] (11-13-20)  Hemoglobin: 8.3 g/dL <L> [11.5 - 15.5] (11-12-20)            If there is a more specific type of post-op anemia, please clarify in progress notes and dc addendum.   - acute post op anemia   - post op anemia due to blood loss   - other   - not clinically significant      Thank you

## 2020-11-16 NOTE — PROGRESS NOTE ADULT - SUBJECTIVE AND OBJECTIVE BOX
seen for hip fracture, dementia    ros unable to obtain due to dementia, minimally verbal    MEDICATIONS  (STANDING):  enoxaparin Injectable 40 milliGRAM(s) SubCutaneous daily  influenza   Vaccine 0.5 milliLiter(s) IntraMuscular once  iron sucrose IVPB 200 milliGRAM(s) IV Intermittent every 24 hours  lidocaine   Patch 1 Patch Transdermal daily    MEDICATIONS  (PRN):  acetaminophen   Tablet .. 650 milliGRAM(s) Oral every 6 hours PRN Mild Pain (1 - 3)  HYDROmorphone  Injectable 0.5 milliGRAM(s) IV Push every 4 hours PRN Severe Pain (7 - 10)  oxyCODONE    IR 5 milliGRAM(s) Oral every 4 hours PRN Moderate Pain (4 - 6)      Allergies    No Known Allergies      Vital Signs Last 24 Hrs  T(C): 36.6 (16 Nov 2020 07:26), Max: 37.6 (15 Nov 2020 15:22)  T(F): 97.8 (16 Nov 2020 07:26), Max: 99.6 (15 Nov 2020 15:22)  HR: 79 (16 Nov 2020 07:26) (68 - 85)  BP: 111/70 (16 Nov 2020 07:26) (109/67 - 141/71)  BP(mean): --  RR: 18 (16 Nov 2020 07:26) (18 - 18)  SpO2: 94% (16 Nov 2020 07:26) (94% - 98%)    PHYSICAL EXAM:    GENERAL: NAD calm  CHEST/LUNG: Clear to percussion bilaterally  HEART: Regular rate and rhythm; S1 S2  ABDOMEN: Soft,  Bowel sounds present  EXTREMITIES: no edema   right hip dressing c/d/i      LABS:                        9.1    4.06  )-----------( 254      ( 16 Nov 2020 06:07 )             27.8     11-16    134<L>  |  102  |  12.0  ----------------------------<  93  4.1   |  24.0  |  0.49<L>    Ca    8.9      16 Nov 2020 06:07            CAPILLARY BLOOD GLUCOSE            RADIOLOGY & ADDITIONAL TESTS:

## 2020-11-16 NOTE — GOALS OF CARE CONVERSATION - ADVANCED CARE PLANNING - CONVERSATION DETAILS
Home hospice referral received. Patient has previously been on [facility] home hospice. Writer/HCN RN telephoned patient's brother/HCP, Warren, who confirmed that he wanted to resume home hospice services. Hospice consent forms reviewed with Warren.     As per Warren, no DME needs at this time.     PATIENT WILL BE DISCHARGED HOME ON SUB-Q LOVENOX ONCE DAILY FOR THE NEXT 4 WEEKS. DISCUSSED THIS WITH DR. GERARDO - HOSPICE DOES NOT COVER THIS. PATIENT'S BROTHER WARREN WILL BE AT [facility] AT 10 AM TOMORROW (TUESDAY) TO LEARN HOW TO ADMINISTER THE LOVENOX INJECTION. HE STATED THAT HE WILL GIVE PATIENT THIS INJECTION AT HOME EVERY DAY FOR THE NEXT 4 WEEKS.     This writer requested 11 a.m. ambulance  tomorrow (Tuesday) morning and will make home hospice team aware that patient to start home hospice care tomorrow.     THIS WRITER IS NOT WORKING TOMORROW OR WEDNESDAY - PLEASE CALL THE MAIN HOSPICE CARE NETWORK NUMBER, [phone] WITH ANY QUESTIONS OR CONCERNS.    Ember Ferrara RN [facility]  [phone]

## 2020-11-16 NOTE — PROGRESS NOTE ADULT - SUBJECTIVE AND OBJECTIVE BOX
CC:  follow up GOC and symptoms  INTERVAL HPI/OVERNIGHT EVENTS:    PRESENT SYMPTOMS: SOURCE:  Patient/Family/Team    PAIN SCALE:  0 = none  1 = mild   2 = moderate  3 = severe    Pain:  assume pain  s/p IMN  - poor self advocate    Dyspnea:  [ ] YES [ x] NO  Anxiety:  [ ] YES [x ] NO  Fatigue: [ x] YES [ ] NO  Nausea: [ ] YES [ x NO  Loss of Appetite: [ ] YES [ x] NO  Other symptoms: __________    MEDICATIONS  (STANDING):  enoxaparin Injectable 40 milliGRAM(s) SubCutaneous daily  influenza   Vaccine 0.5 milliLiter(s) IntraMuscular once  iron sucrose IVPB 200 milliGRAM(s) IV Intermittent every 24 hours  lidocaine   Patch 1 Patch Transdermal daily    MEDICATIONS  (PRN):  acetaminophen   Tablet .. 650 milliGRAM(s) Oral every 6 hours PRN Mild Pain (1 - 3)  HYDROmorphone  Injectable 0.5 milliGRAM(s) IV Push every 4 hours PRN Severe Pain (7 - 10)  oxyCODONE    IR 5 milliGRAM(s) Oral every 4 hours PRN Moderate Pain (4 - 6)      Allergies    No Known Allergies    Intolerances    Karnofsky Performance Score/Palliative Performance Status Version 2:   20  %    Vital Signs Last 24 Hrs  T(C): 36.6 (16 Nov 2020 07:26), Max: 37.6 (15 Nov 2020 15:22)  T(F): 97.8 (16 Nov 2020 07:26), Max: 99.6 (15 Nov 2020 15:22)  HR: 79 (16 Nov 2020 07:26) (68 - 85)  BP: 111/70 (16 Nov 2020 07:26) (109/67 - 141/71)  BP(mean): --  RR: 18 (16 Nov 2020 07:26) (18 - 18)  SpO2: 94% (16 Nov 2020 07:26) (94% - 98%)    PHYSICAL EXAM:    General: F-  awake NAD AOx1 thin frail   HEENT: [ x] normal  [ ] dry mouth  [ ] ET tube/trach    Lungs: [ x] comfortable [ ] tachypnea/labored breathing  [ ] excessive secretions    CV: [x ] normal  [ ] tachycardia    GI: [ x] normal  [ ] distended  [ ] tender  [ ] no BS               [ ] PEG/NG/OG tube    : [x ] normal  [x ] incontinent  [ ] oliguria/anuria  [ ] wilks    MSK: [ ] normal  [ x] weakness  [ ] edema             [ ] ambulatory  [ x] bedbound/wheelchair bound    Skin: [ ] normal  [ ] pressure ulcers- Stage_____  [ x] no rash    LABS:                        9.1    4.06  )-----------( 254      ( 16 Nov 2020 06:07 )             27.8     11-16    134<L>  |  102  |  12.0  ----------------------------<  93  4.1   |  24.0  |  0.49<L>    Ca    8.9      16 Nov 2020 06:07          I&O's Summary    15 Nov 2020 07:01  -  16 Nov 2020 07:00  --------------------------------------------------------  IN: 700 mL / OUT: 2350 mL / NET: -1650 mL        RADIOLOGY & ADDITIONAL STUDIES:    < from: Xray Femur 2 Views, Right (11.13.20 @ 19:19) >     EXAM:  XR FEMUR 2 VIEWS RT                          PROCEDURE DATE:  11/13/2020          INTERPRETATION:  HISTORY: Postop    TECHNIQUE: 2 views of the right femur    FINDINGS:    Status post open reduction internal fixation of the right femur fracture with a right hip screw, long femoral zaira and distal interlocking screw. Adjacent postoperative changes.    IMPRESSION:    Status post open reduction internal fixation of the right femur fracture          < end of copied text >

## 2020-11-17 ENCOUNTER — TRANSCRIPTION ENCOUNTER (OUTPATIENT)
Age: 75
End: 2020-11-17

## 2020-11-17 PROCEDURE — 85027 COMPLETE CBC AUTOMATED: CPT

## 2020-11-17 PROCEDURE — 80053 COMPREHEN METABOLIC PANEL: CPT

## 2020-11-17 PROCEDURE — 93005 ELECTROCARDIOGRAM TRACING: CPT

## 2020-11-17 PROCEDURE — 73560 X-RAY EXAM OF KNEE 1 OR 2: CPT

## 2020-11-17 PROCEDURE — 97163 PT EVAL HIGH COMPLEX 45 MIN: CPT

## 2020-11-17 PROCEDURE — C1713: CPT

## 2020-11-17 PROCEDURE — 80048 BASIC METABOLIC PNL TOTAL CA: CPT

## 2020-11-17 PROCEDURE — 82607 VITAMIN B-12: CPT

## 2020-11-17 PROCEDURE — 85730 THROMBOPLASTIN TIME PARTIAL: CPT

## 2020-11-17 PROCEDURE — 86901 BLOOD TYPING SEROLOGIC RH(D): CPT

## 2020-11-17 PROCEDURE — 85025 COMPLETE CBC W/AUTO DIFF WBC: CPT

## 2020-11-17 PROCEDURE — 70450 CT HEAD/BRAIN W/O DYE: CPT

## 2020-11-17 PROCEDURE — 82962 GLUCOSE BLOOD TEST: CPT

## 2020-11-17 PROCEDURE — 83735 ASSAY OF MAGNESIUM: CPT

## 2020-11-17 PROCEDURE — 36430 TRANSFUSION BLD/BLD COMPNT: CPT

## 2020-11-17 PROCEDURE — 99239 HOSP IP/OBS DSCHRG MGMT >30: CPT

## 2020-11-17 PROCEDURE — 72125 CT NECK SPINE W/O DYE: CPT

## 2020-11-17 PROCEDURE — 86850 RBC ANTIBODY SCREEN: CPT

## 2020-11-17 PROCEDURE — 72192 CT PELVIS W/O DYE: CPT

## 2020-11-17 PROCEDURE — 73552 X-RAY EXAM OF FEMUR 2/>: CPT

## 2020-11-17 PROCEDURE — 86923 COMPATIBILITY TEST ELECTRIC: CPT

## 2020-11-17 PROCEDURE — 71045 X-RAY EXAM CHEST 1 VIEW: CPT

## 2020-11-17 PROCEDURE — 86900 BLOOD TYPING SEROLOGIC ABO: CPT

## 2020-11-17 PROCEDURE — C1769: CPT

## 2020-11-17 PROCEDURE — 73502 X-RAY EXAM HIP UNI 2-3 VIEWS: CPT

## 2020-11-17 PROCEDURE — 93971 EXTREMITY STUDY: CPT

## 2020-11-17 PROCEDURE — 96374 THER/PROPH/DIAG INJ IV PUSH: CPT

## 2020-11-17 PROCEDURE — P9016: CPT

## 2020-11-17 PROCEDURE — 85610 PROTHROMBIN TIME: CPT

## 2020-11-17 PROCEDURE — 87635 SARS-COV-2 COVID-19 AMP PRB: CPT

## 2020-11-17 PROCEDURE — 99285 EMERGENCY DEPT VISIT HI MDM: CPT | Mod: 25

## 2020-11-17 PROCEDURE — 86769 SARS-COV-2 COVID-19 ANTIBODY: CPT

## 2020-11-17 PROCEDURE — 36415 COLL VENOUS BLD VENIPUNCTURE: CPT

## 2020-11-17 PROCEDURE — 76000 FLUOROSCOPY <1 HR PHYS/QHP: CPT

## 2020-11-17 RX ORDER — ENOXAPARIN SODIUM 100 MG/ML
40 INJECTION SUBCUTANEOUS
Qty: 28 | Refills: 0
Start: 2020-11-17 | End: 2020-12-14

## 2020-11-17 RX ORDER — ACETAMINOPHEN 500 MG
2 TABLET ORAL
Qty: 0 | Refills: 0 | DISCHARGE
Start: 2020-11-17

## 2020-11-17 RX ORDER — FERROUS SULFATE 325(65) MG
1 TABLET ORAL
Qty: 10 | Refills: 0
Start: 2020-11-17 | End: 2020-11-26

## 2020-11-17 NOTE — CHART NOTE - TREATMENT: THE FOLLOWING DIET HAS BEEN RECOMMENDED
Continue diet as tolerated.   Ensure Pudding TID.  Encourage po intake, monitor diet tolerance, and provide assistance at meals as needed.

## 2020-11-17 NOTE — DIETITIAN INITIAL EVALUATION ADULT. - ETIOLOGY
related to inability to meet sufficient protein-energy in setting of advanced dementia, dysphagia, now with Right hip fx s/p IM nailing

## 2020-11-17 NOTE — DIETITIAN INITIAL EVALUATION ADULT. - PERTINENT MEDS FT
MEDICATIONS  (STANDING):  enoxaparin Injectable 40 milliGRAM(s) SubCutaneous daily  influenza   Vaccine 0.5 milliLiter(s) IntraMuscular once  iron sucrose IVPB 200 milliGRAM(s) IV Intermittent every 24 hours  lidocaine   Patch 1 Patch Transdermal daily    MEDICATIONS  (PRN):  acetaminophen   Tablet .. 650 milliGRAM(s) Oral every 6 hours PRN Mild Pain (1 - 3)  HYDROmorphone  Injectable 0.5 milliGRAM(s) IV Push every 4 hours PRN Severe Pain (7 - 10)  oxyCODONE    IR 5 milliGRAM(s) Oral every 4 hours PRN Moderate Pain (4 - 6)

## 2020-11-17 NOTE — DIETITIAN INITIAL EVALUATION ADULT. - OTHER INFO
75 year old female from home, wheelchair bound, non verbal with PMH of severe dementia brought for right hip fracture. Now s/p IM nailing. Pt non-verbal, unable to obtain nutrition hx. Currently on a pureed diet. Limited NFPE conducted. Aware of plan for discharge to home with Hospice.

## 2020-11-17 NOTE — DISCHARGE NOTE NURSING/CASE MANAGEMENT/SOCIAL WORK - PATIENT PORTAL LINK FT
You can access the FollowMyHealth Patient Portal offered by [facility] by registering at the following website: [email or web address]. By joining [employer]’s FollowMyHealth portal, you will also be able to view your health information using other applications (apps) compatible with our system.

## 2020-11-18 VITALS
TEMPERATURE: 99 F | SYSTOLIC BLOOD PRESSURE: 118 MMHG | DIASTOLIC BLOOD PRESSURE: 75 MMHG | RESPIRATION RATE: 18 BRPM | OXYGEN SATURATION: 95 % | HEART RATE: 67 BPM

## 2020-12-14 NOTE — PHYSICAL THERAPY INITIAL EVALUATION ADULT - PERSONAL SAFETY AND JUDGMENT, REHAB EVAL
[facility]  INFECTIOUS DISEASES AND INTERNAL MEDICINE at [place]  =======================================================  Tiffany Croft MD  Diplomates American Board of Internal Medicine and Infectious Diseases  Tel  [phone]  Fax [phone]  =======================================================    [id number]  TIFFANY BOWLES  follow up:   COVID-19 pneumonia    remains with confusion;   CXR done this AM    =======================================================    REVIEW OF SYSTEMS:  Limited due to medical condition    =======================================================  Allergies  No Known Allergies     ======================================================  Physical Exam:  ============     General:  No acute distress.  Eye: BRYANNA EOMI  HENT: Normocephalic, Oral mucosa is moist, No pharyngeal erythema, No sinus tenderness.  forcefully closing eyes  edentulous  Neck: Supple, No lymphadenopathy.  Respiratory: Lungs  with fair air entry posteriorly  Cardiovascular: Normal rate, Regular rhythm,   Gastrointestinal: Soft, Non-tender, Non-distended, Normal bowel sounds.  Genitourinary: No costovertebral angle tenderness.  Lymphatics: No lymphadenopathy neck,   Musculoskeletal: Normal range of motion, Normal strength.  Integumentary: No rash.  Neurologic: moves all extremities, Cranial Nerves II-XII are grossly intact.  Psychiatric: limited    =======================================================   Vitals:  ============  T(F): 98.1 (14 Dec 2020 07:40), Max: 99 (13 Dec 2020 16:21)  HR: 96 (14 Dec 2020 07:40)  BP: 132/66 (14 Dec 2020 07:40)  RR: 18 (14 Dec 2020 07:40)  SpO2: 93% (14 Dec 2020 07:40) (93% - 98%)  temp max in last 48H T(F): , Max: 99 (12-13-20 @ 16:21)    =======================================================  Current Antibiotics:  meropenem  IVPB 1000 milliGRAM(s) IV Intermittent every 12 hours    Other medications:  allopurinol 100 milliGRAM(s) Oral daily  amLODIPine   Tablet 7.5 milliGRAM(s) Oral daily  aspirin  chewable 81 milliGRAM(s) Oral daily  atorvastatin 10 milliGRAM(s) Oral at bedtime  dextrose 5%. 1000 milliLiter(s) IV Continuous <Continuous>  enoxaparin Injectable 40 milliGRAM(s) SubCutaneous daily  memantine 10 milliGRAM(s) Oral two times a day  QUEtiapine 25 milliGRAM(s) Oral daily  saccharomyces boulardii 250 milliGRAM(s) Oral two times a day      =======================================================  Labs:                        12.5   6.37  )-----------( 173      ( 13 Dec 2020 09:50 )             36.0      12-14    149<H>  |  118<H>  |  45.0<H>  ----------------------------<  148<H>  3.9   |  20.0<L>  |  1.28    Ca    8.7      14 Dec 2020 07:26  Mg     2.4     12-14    TPro  6.7  /  Alb  3.2<L>  /  TBili  0.8  /  DBili  x   /  AST  158<H>  /  ALT  70<H>  /  AlkPhos  66  12-13      Culture - Blood (collected 12-09-20 @ 08:32)  Source: .Blood Blood  Final Report (12-14-20 @ 09:01):    No growth at 5 days.    Culture - Blood (collected 12-09-20 @ 08:32)  Source: .Blood Blood  Final Report (12-14-20 @ 09:01):    No growth at 5 days.    Creatinine, Serum: 1.28 mg/dL (12-14-20 @ 07:26)  Creatinine, Serum: 1.35 mg/dL (12-13-20 @ 09:50)  Creatinine, Serum: 1.74 mg/dL (12-12-20 @ 06:18)  Creatinine, Serum: 1.52 mg/dL (12-10-20 @ 08:11)    Procalcitonin, Serum: 0.26 ng/mL (12-13-20 @ 09:50)      Ferritin, Serum: 736 ng/mL (12-13-20 @ 09:50)  Ferritin, Serum: 357 ng/mL (12-09-20 @ 19:25)      WBC Count: 6.37 K/uL (12-13-20 @ 09:50)  WBC Count: 8.23 K/uL (12-12-20 @ 06:18)  WBC Count: 3.61 K/uL (12-10-20 @ 08:11)    SARS-CoV-2: Detected (12-09-20 @ 07:38)  Rapid RVP Result: Detected (12-09-20 @ 07:38)    COVID-19 IgG Antibody Interpretation: Negative (12-10-20 @ 07:06)  COVID-19 IgG Antibody Index: 0.21 Index (12-10-20 @ 07:06)    Lactate Dehydrogenase, Serum: 619 U/L (12-13-20 @ 09:50)  Lactate Dehydrogenase, Serum: 546 U/L (12-09-20 @ 19:25)    Alkaline Phosphatase, Serum: 66 U/L (12-13-20 @ 09:50)  Alanine Aminotransferase (ALT/SGPT): 70 U/L (12-13-20 @ 09:50)  Aspartate Aminotransferase (AST/SGOT): 158 U/L (12-13-20 @ 09:50)  Bilirubin Total, Serum: 0.8 mg/dL (12-13-20 @ 09:50)        < from: Xray Chest 1 View- PORTABLE-Urgent (12.09.20 @ 06:40) >     EXAM:  XR CHEST PORTABLE URGENT 1V                          PROCEDURE DATE:  12/09/2020          INTERPRETATION:  TECHNIQUE: Single portable view of the chest.    COMPARISON: None.    CLINICAL HISTORY: Chest Pain    FINDINGS:    Single frontal view of the chest demonstrates small right hilar infiltrate/atelectasis. Left hilar segmental atelectasis. The cardiomediastinal silhouette is normal. No acute osseous abnormalities.  IMPRESSION: Small right hilar infiltrate/atelectasis.       EZ RHODES MD; Attending Radiologist  This document has been electronically signed. Dec  9 2020  8:31AM    < end of copied text >      Antibiotics Course:  azithromycin  IVPB   255 mL/Hr (12-09-20 @ 08:55)    azithromycin  IVPB   255 mL/Hr (12-10-20 @ 12:27)    cefTRIAXone   IVPB   100 mL/Hr (12-10-20 @ 12:27)    cefTRIAXone   IVPB   100 mL/Hr (12-09-20 @ 08:32)    meropenem  IVPB   100 mL/Hr (12-14-20 @ 07:00)   100 mL/Hr (12-13-20 @ 17:07)   100 mL/Hr (12-13-20 @ 05:13)   100 mL/Hr (12-12-20 @ 18:59)   100 mL/Hr (12-12-20 @ 06:15)   100 mL/Hr (12-11-20 @ 18:56)     impaired

## 2021-03-14 ENCOUNTER — EMERGENCY (EMERGENCY)
Facility: HOSPITAL | Age: 76
LOS: 1 days | Discharge: HOME | End: 2021-03-14
Attending: EMERGENCY MEDICINE
Payer: MEDICARE

## 2021-03-14 VITALS
RESPIRATION RATE: 18 BRPM | OXYGEN SATURATION: 96 % | TEMPERATURE: 98 F | HEART RATE: 76 BPM | SYSTOLIC BLOOD PRESSURE: 128 MMHG | DIASTOLIC BLOOD PRESSURE: 80 MMHG

## 2021-03-14 VITALS
HEIGHT: 60.1 IN | SYSTOLIC BLOOD PRESSURE: 132 MMHG | OXYGEN SATURATION: 97 % | HEART RATE: 77 BPM | WEIGHT: 190.04 LBS | DIASTOLIC BLOOD PRESSURE: 84 MMHG | RESPIRATION RATE: 18 BRPM

## 2021-03-14 LAB
ALBUMIN SERPL ELPH-MCNC: 4.1 G/DL — SIGNIFICANT CHANGE UP (ref 3.3–5.2)
ALP SERPL-CCNC: 68 U/L — SIGNIFICANT CHANGE UP (ref 40–120)
ALT FLD-CCNC: 13 U/L — SIGNIFICANT CHANGE UP
ANION GAP SERPL CALC-SCNC: 12 MMOL/L — SIGNIFICANT CHANGE UP (ref 5–17)
APTT BLD: 29.6 SEC — SIGNIFICANT CHANGE UP (ref 27.5–35.5)
AST SERPL-CCNC: 18 U/L — SIGNIFICANT CHANGE UP
BASOPHILS # BLD AUTO: 0.05 K/UL — SIGNIFICANT CHANGE UP (ref 0–0.2)
BASOPHILS NFR BLD AUTO: 1.2 % — SIGNIFICANT CHANGE UP (ref 0–2)
BILIRUB SERPL-MCNC: 0.3 MG/DL — LOW (ref 0.4–2)
BUN SERPL-MCNC: 16 MG/DL — SIGNIFICANT CHANGE UP (ref 8–20)
CALCIUM SERPL-MCNC: 9.7 MG/DL — SIGNIFICANT CHANGE UP (ref 8.6–10.2)
CHLORIDE SERPL-SCNC: 98 MMOL/L — SIGNIFICANT CHANGE UP (ref 98–107)
CO2 SERPL-SCNC: 25 MMOL/L — SIGNIFICANT CHANGE UP (ref 22–29)
CREAT SERPL-MCNC: 0.71 MG/DL — SIGNIFICANT CHANGE UP (ref 0.5–1.3)
EOSINOPHIL # BLD AUTO: 0.32 K/UL — SIGNIFICANT CHANGE UP (ref 0–0.5)
EOSINOPHIL NFR BLD AUTO: 7.6 % — HIGH (ref 0–6)
GLUCOSE SERPL-MCNC: 114 MG/DL — HIGH (ref 70–99)
HCT VFR BLD CALC: 39 % — SIGNIFICANT CHANGE UP (ref 34.5–45)
HGB BLD-MCNC: 12.7 G/DL — SIGNIFICANT CHANGE UP (ref 11.5–15.5)
IMM GRANULOCYTES NFR BLD AUTO: 0.2 % — SIGNIFICANT CHANGE UP (ref 0–1.5)
INR BLD: 0.98 RATIO — SIGNIFICANT CHANGE UP (ref 0.88–1.16)
LYMPHOCYTES # BLD AUTO: 1.64 K/UL — SIGNIFICANT CHANGE UP (ref 1–3.3)
LYMPHOCYTES # BLD AUTO: 38.9 % — SIGNIFICANT CHANGE UP (ref 13–44)
MCHC RBC-ENTMCNC: 31.6 PG — SIGNIFICANT CHANGE UP (ref 27–34)
MCHC RBC-ENTMCNC: 32.6 GM/DL — SIGNIFICANT CHANGE UP (ref 32–36)
MCV RBC AUTO: 97 FL — SIGNIFICANT CHANGE UP (ref 80–100)
MONOCYTES # BLD AUTO: 0.41 K/UL — SIGNIFICANT CHANGE UP (ref 0–0.9)
MONOCYTES NFR BLD AUTO: 9.7 % — SIGNIFICANT CHANGE UP (ref 2–14)
NEUTROPHILS # BLD AUTO: 1.79 K/UL — LOW (ref 1.8–7.4)
NEUTROPHILS NFR BLD AUTO: 42.4 % — LOW (ref 43–77)
PLATELET # BLD AUTO: 280 K/UL — SIGNIFICANT CHANGE UP (ref 150–400)
POTASSIUM SERPL-MCNC: 4.2 MMOL/L — SIGNIFICANT CHANGE UP (ref 3.5–5.3)
POTASSIUM SERPL-SCNC: 4.2 MMOL/L — SIGNIFICANT CHANGE UP (ref 3.5–5.3)
PROT SERPL-MCNC: 6.7 G/DL — SIGNIFICANT CHANGE UP (ref 6.6–8.7)
PROTHROM AB SERPL-ACNC: 11.4 SEC — SIGNIFICANT CHANGE UP (ref 10.6–13.6)
RBC # BLD: 4.02 M/UL — SIGNIFICANT CHANGE UP (ref 3.8–5.2)
RBC # FLD: 14.2 % — SIGNIFICANT CHANGE UP (ref 10.3–14.5)
SODIUM SERPL-SCNC: 135 MMOL/L — SIGNIFICANT CHANGE UP (ref 135–145)
WBC # BLD: 4.22 K/UL — SIGNIFICANT CHANGE UP (ref 3.8–10.5)
WBC # FLD AUTO: 4.22 K/UL — SIGNIFICANT CHANGE UP (ref 3.8–10.5)

## 2021-03-14 PROCEDURE — 99284 EMERGENCY DEPT VISIT MOD MDM: CPT

## 2021-03-14 PROCEDURE — 99283 EMERGENCY DEPT VISIT LOW MDM: CPT | Mod: GC

## 2021-03-14 PROCEDURE — 36415 COLL VENOUS BLD VENIPUNCTURE: CPT

## 2021-03-14 PROCEDURE — 93971 EXTREMITY STUDY: CPT

## 2021-03-14 PROCEDURE — 85610 PROTHROMBIN TIME: CPT

## 2021-03-14 PROCEDURE — 93971 EXTREMITY STUDY: CPT | Mod: 26,LT

## 2021-03-14 PROCEDURE — 80053 COMPREHEN METABOLIC PANEL: CPT

## 2021-03-14 PROCEDURE — 85730 THROMBOPLASTIN TIME PARTIAL: CPT

## 2021-03-14 PROCEDURE — 85025 COMPLETE CBC W/AUTO DIFF WBC: CPT

## 2021-03-14 PROCEDURE — 99284 EMERGENCY DEPT VISIT MOD MDM: CPT | Mod: 25

## 2021-03-14 RX ORDER — RIVAROXABAN 15 MG-20MG
15 KIT ORAL ONCE
Refills: 0 | Status: COMPLETED | OUTPATIENT
Start: 2021-03-14 | End: 2021-03-14

## 2021-03-14 RX ORDER — RIVAROXABAN 15 MG-20MG
1 KIT ORAL
Qty: 1 | Refills: 0
Start: 2021-03-14 | End: 2021-04-12

## 2021-03-14 RX ADMIN — RIVAROXABAN 15 MILLIGRAM(S): KIT at 14:34

## 2021-03-14 NOTE — ED PROVIDER NOTE - CLINICAL SUMMARY MEDICAL DECISION MAKING FREE TEXT BOX
77 yo F hx of dementia p/w left leg swelling x 3 days. no chest pain or sob. 75 yo F hx of dementia p/w left leg swelling x 3 days. no chest pain or sob. Duplex found extensive left sided DVT. patient seen by surgery, can go home with oral anticoagulation. xarlto given. return precausion given.

## 2021-03-14 NOTE — CONSULT NOTE ADULT - ASSESSMENT
Patient is a 76 F with advanced dementia who was previously on home hospice discontinued 2-3 weeks ago and now presents with extensive DVT to left lower extremity from common femoral vein to peroneal vein. Per discussion with brother who has power of , plan for discharge to home on medical management without future plan for surgical intervention. If patient with worsening sequela following discharge from hospital, the family will re-engage for home hospice.  - D/C to home on DOAC   - No surgical interventions needed at this time  - Please page surgery if there are any further questions

## 2021-03-14 NOTE — ED ADULT NURSE NOTE - AS SC BRADEN SENSORY
Patient is having Rt leg calf pain.  Patient is wondering if it could be a blood clot.  Calf is not warm to the touch, but seems to be swollen.  Patient was off of work for 1 week due to covid.  Please call patient to discuss   (1) completely limited

## 2021-03-14 NOTE — ED PROVIDER NOTE - CARE PLAN
Principal Discharge DX:	Acute deep vein thrombosis (DVT) of left lower extremity, unspecified vein

## 2021-03-14 NOTE — ED PROVIDER NOTE - PHYSICAL EXAMINATION
VITAL SIGNS: I have reviewed nursing notes and confirm.  CONSTITUTIONAL: well-nourished; in no acute distress.  SKIN: Skin exam is warm and dry, no acute rash.  HEAD: Normocephalic; atraumatic.  EYES: PERRL, EOM intact; conjunctiva and sclera clear.  ENT: No nasal discharge; airway clear. Throat clear.  NECK: Supple; non tender.    CARD: S1, S2 normal; Regular rate and rhythm.  RESP: No wheezes,  no rales or rhonchi.   ABD:  soft; non-distended; non-tender;   EXT: (+) diffuse non-pitting edema of left leg from foot to thigh, warm feet, dopperable DP and PT pulses, chronic venous status appearance    NEURO: Baseline mental status. Grossly unremarkable. No focal deficits. no facial droop,

## 2021-03-14 NOTE — CONSULT NOTE ADULT - SUBJECTIVE AND OBJECTIVE BOX
Vascular Attending:  Dr. Duke      HPI: Patient is 76 with advanced dementia who was previously on home hospice care following IMN for right femur. She was placed in home hospice up until 2-3 weeks ago for failure to thrive in setting of her advanced dementia. Given patient's advanced dementia, she is relatively non-verbal with inappropriate words to questions, history was obtained from brother who was at bedside (Warren Marin). For past 4 days patient has been having left lower extremity swelling. Patient with prior history of lymphedema and trial of using compression stockings did not alleviate swelling and made pain worse. Patient presents at this time with US showing DVT from left common femoral vein to peroneal vein. No chest pain or shortness of breath. On conversation with brother who has power of , goal for patient was for discharge to home as soon as possible given her debilitating baseline medical status. Further interventions, if warranted would be deferred at this time. And plan is that if patient continues to have failure of response to medical management to re-engage home hospice for end of life care.      PAST MEDICAL & SURGICAL HISTORY:  Advanced dementia    Anemia    No significant past surgical history    No significant past surgical history        MEDICATIONS  (STANDING):    MEDICATIONS  (PRN):      Allergies    No Known Allergies    Intolerances        SOCIAL HISTORY:    FAMILY HISTORY:  FH: hypertension        Vital Signs Last 24 Hrs  T(C): --  T(F): --  HR: 77 (14 Mar 2021 09:51) (77 - 77)  BP: 132/84 (14 Mar 2021 09:51) (132/84 - 132/84)  BP(mean): --  RR: 18 (14 Mar 2021 09:51) (18 - 18)  SpO2: 97% (14 Mar 2021 09:51) (97% - 97%)    PHYSICAL EXAM:  GEN: NAD, laying in bed, appears stated age  HEENT: NCAT, clear oral mucosa, normal conjunctiva  Chest: non-tender  CV:  non-tachycardic, 2+ radial pulse  Pulm: no increased work of breathing, no conversational dyspnea  GI: soft, non-tender  MSK: moving all extremities, LLE with swelling from thigh to foot with 2+ pitting edema, tender to palpation without skin changes  Vasc: palpable right DP pulse, Signals to left DP. Palp bilateral femoral pulses    LABS:                        12.7   4.22  )-----------( 280      ( 14 Mar 2021 10:26 )             39.0     03-14    135  |  98  |  16.0  ----------------------------<  114<H>  4.2   |  25.0  |  0.71    Ca    9.7      14 Mar 2021 10:26    TPro  6.7  /  Alb  4.1  /  TBili  0.3<L>  /  DBili  x   /  AST  18  /  ALT  13  /  AlkPhos  68  03-14    PT/INR - ( 14 Mar 2021 10:26 )   PT: 11.4 sec;   INR: 0.98 ratio         PTT - ( 14 Mar 2021 10:26 )  PTT:29.6 sec      RADIOLOGY & ADDITIONAL STUDIES

## 2021-03-14 NOTE — ED PROVIDER NOTE - PROGRESS NOTE DETAILS
duplex showed extensive DVT of the left leg. family update. vascular surgery consulted, will come to see the patient. patient seen by vascular, can go home on oral anticoagulation. return instruction given. xartlto given. I spoke to casemanagement will ensure patient's insurance will cover xarlto. [place] pharmacy not open today. no able to verify. family comfortable going home. if there's issues with insurance will call back to hospital. patient seen by vascular, can go home on oral anticoagulation. return instruction given. xartlto given. I spoke to case management will ensure patient's insurance will cover xarlto.

## 2021-03-14 NOTE — ED PROVIDER NOTE - OBJECTIVE STATEMENT
77 yo F hx of TIA, dementia, right hip replacement brought in by brother (health care proxy) for left leg swelling x 3 days. Brother report hx of leg swelling 4 years ago due to immobility and has been using a compression boots at home. Patient mostly wheel chair depended especially after her right hip surgery 5 months ago. No fever. no chest pain. no sob. tolerating PO at home. Brother report baseline mental status. patient is not taking any medication. patient was on hospice care but was discharge from hospice. patient has 24 hr aid at home. patient DNR/DNI.

## 2021-03-14 NOTE — ED ADULT TRIAGE NOTE - CHIEF COMPLAINT QUOTE
Patient is nonverbal @ baseline c/o LLE swelling, worsening this week. Hx TIA and dementia, acting baseline per family bedside. +4 pitting edema noted to left leg, +PMS to LLE. Family bedside requesting doppler study for r/o DVT. RR even and unlabored, skin warm and dry

## 2021-03-14 NOTE — ED PROVIDER NOTE - PATIENT PORTAL LINK FT
You can access the FollowMyHealth Patient Portal offered by [facility] by registering at the following website: [email or web address]. By joining [employer]’s FollowMyHealth portal, you will also be able to view your health information using other applications (apps) compatible with our system.

## 2021-03-14 NOTE — ED ADULT NURSE NOTE - AS SC BRADEN ACTIVITY
(2) chairfast Prescription refilled per request to preferred pharmacy. She is due for a physical 1/2018.

## 2021-03-14 NOTE — ED ADULT NURSE NOTE - OBJECTIVE STATEMENT
pt complaining of LLE 4+ pitting edema, worsening within last couple of days. LLE is red, swollen, pedal and dorsalis present. MD present with doppler at bedside. positive pulses audible with doppler. Power of  present at bedside. pt has a hx of dementia and TIA'S. RR even and unlabored. pt educated on plan of care, pt able to successfully teach back plan of care to RN, RN will continue to reeducate pt during hospital stay. pt complaining of LLE 4+ pitting edema, worsening within last couple of days. LLE is red, swollen, pedal and dorsalis present. MD present with doppler at bedside. positive pulses audible with doppler. Power of  present at bedside. pt has a hx of dementia and TIA'S. RR even and unlabored. pt educated on plan of care, RN will continue to reeducate pt during hospital stay. pt complaining of LLE 4+ pitting edema, worsening within last couple of days. LLE is red, swollen, pedal and dorsalis present. MD present with doppler at bedside. positive pulses audible with doppler. Power of  present at bedside. pt's brother states pt is non verbal at baseline. As per brother, no non verbal indicators of pain present. pts past has a hx of dementia and TIA'S. RR even and unlabored. pt educated on plan of care, RN will continue to reeducate pt during hospital stay.

## 2021-03-14 NOTE — ED PROVIDER NOTE - NS ED ROS FT
Review of Systems  •	CONSTITUTIONAL - no  fever, no diaphoresis, no weight change  •	SKIN - no rash  •	HEMATOLOGIC - no bleeding, no bruising  •	EYES - no eye pain, no blurred vision  •	ENT - no change in hearing, no pain  •	RESPIRATORY - no shortness of breath, no cough  •	CARDIAC - no chest pain, no palpitations  •	GI - no abd pain, no nausea, no vomiting, no diarrhea, no constipation, no bleeding  •	GENITO-URINARY - no discharge, no dysuria; no hematuria,   •	ENDO - no polydipsia, no polyuria, no heat/no cold intolerance  •	MUSCULOSKELETAL - no joint pain, (+) left leg swelling, no redness  •	NEUROLOGIC - no weakness, no headache, no anesthesia, no paresthesias  •	PSYCH - no anxiety, non suicidal, non homicidal, no hallucination, no depression

## 2021-03-30 NOTE — ASU PREOP CHECKLIST - NSSDAENDDT_GEN_ALL_CORE
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    Anesthesia Type:   General     Note:    Oropharynx: oropharynx clear of all foreign objects and spontaneously breathing  Level of Consciousness: awake  Oxygen Supplementation: face mask  Level of Supplemental Oxygen (L/min / FiO2): 6  Independent Airway: airway patency satisfactory and stable  Dentition: dentition unchanged  Vital Signs Stable: post-procedure vital signs reviewed and stable  Report to RN Given: handoff report given  Patient transferred to: PACU  Comments: Extubated in OR 15, transferred to PACU with oxygen, hemodynamically stable. Upon arrival to PACU, pain is nil, no nausea. SpO2 99% on 6L O2 via face mask.     Daron Guy MD  Handoff Report: Identifed the Patient, Identified the Reponsible Provider, Reviewed the pertinent medical history, Discussed the surgical course, Reviewed Intra-OP anesthesia mangement and issues during anesthesia, Set expectations for post-procedure period and Allowed opportunity for questions and acknowledgement of understanding      Vitals: (Last set prior to Anesthesia Care Transfer)  CRNA VITALS  3/30/2021 0811 - 3/30/2021 0849      3/30/2021             Pulse:  156    Ht Rate:  151    SpO2:  100 %    Resp Rate (observed):  (!) 2        Electronically Signed By: Daron Guy MD  March 30, 2021  8:49 AM  
13-Nov-2020 14:17

## 2022-01-01 NOTE — ED PROVIDER NOTE - PRINCIPAL DIAGNOSIS
Acute deep vein thrombosis (DVT) of left lower extremity, unspecified vein CULLEN TRUONG; First Name: __Asa____      GA 26.6 weeks;     Age: 27 d;   PMA: 30.3 wk  BW:  607   MRN: [id number]    COURSE: 26w with RDS, Esophageal perforation, Immature thermoregulation, Anemia, Direct hyperbilirubinemia, Pneumonia    INTERVAL EVENTS:  Abx restarted. New PICC and ETT.    Weight (g): 1082 +47     Intake (ml/kg/day): 149  Urine output (ml/kg/hr or frequency): 4.4  Stools (frequency): x 2  Other: Isolette    Growth:    HC (cm): 21.5 (1%)         Length (cm):  28 (0%)    Francisca weight 8%       ADWG (g/day) +4  *******************************************************  Respiratory: RDS. Currently on SIMV 40 32/7 PS 14 FiO2 50-60%  ETT 2.5 at 7  s/p HFOV, 7.27/88/  CXR  9/28 Diffuse patchy infiltrates and chronic lung disease changes   Caffeine for apnea of prematurity and chronic lung disease  S/P surf x 2, SIMV  - follow gas closely  - follow CXR to evaluate lung fields  CV: More stable. Observe for the signs of PDA. 9/13 ECHO: PFO, cannot completely rule out small PDA.     s/p 3 day course  Lasix day  9/24   S/P Hypotensive req. dopamine, hydrocortisone.   - repeat echo  Hem: Direct hyperbilirubinemia improved . Monitoring anemia and thrombocytopenia. Last pRBC transfusion 9/29  S/P photo  FEN:  Infant has not had any feeds in 21 days due to esophageal perforation. 7-9 ml q 3 (70 ml/kg) plus TPN D12.5  + SMOF (15) at 150 mL/kg/day Will discuss timing of NG placement and feeds with peds surgery   S/P Hypernatremia.    ACCESS: PICC 10/1. Necessary for fluids and nutrition. Ongoing need is assessed daily.   ID: 9/19 Presumed sepsis due to worsening resp., status. 9/19 Blood: Cx: Negative, 9/19 Urine Cx: Negative. On CXR appears to have infiltrate C/W Pneumonia and ID recommended changing antibiotics  to zosyn s/p 7 day course completed   MSSA + on 9/25 - start mupiricin x 5 days. 10/1 Abx restarted due to persistent infiltrate  S/P Presumed sepsis. S/P Zosyn 7 days for perf.   S/P fluconazole. Observed and empirically treated for possible sepsis 9/14-15 in the setting of worsened respiratory acidosis.  Neuro:  HUS 9/6, 9/8, 9/12: No IVH. rpt at 1month of age ( 10/3)   Ophtho: At risk for ROP. Screening at 4 weeks/31 weeks of PMA.  Thermal: Immature thermoregulation, requires incubator.   Social: Mother usually calls on regular basis.    Meds: caffeine, cefepime 1/7  Plan: Continue to adjust vent to support BPD.  HUS at 1 month. HUS at 1 month. Continue cefepime and follow CXR. May need NS nebs.  Labs/Images/Studies:       This patient requires ICU care including continuous monitoring and frequent vital sign assessment due to significant risk of cardiorespiratory compromise or decompensation outside of the NICU.

## 2022-03-30 ENCOUNTER — EMERGENCY (EMERGENCY)
Facility: HOSPITAL | Age: 77
LOS: 1 days | Discharge: HOME | End: 2022-03-30
Attending: EMERGENCY MEDICINE
Payer: MEDICARE

## 2022-03-30 VITALS
HEIGHT: 60.1 IN | DIASTOLIC BLOOD PRESSURE: 74 MMHG | SYSTOLIC BLOOD PRESSURE: 132 MMHG | RESPIRATION RATE: 18 BRPM | TEMPERATURE: 98 F | WEIGHT: 154.98 LBS | HEART RATE: 90 BPM | OXYGEN SATURATION: 98 %

## 2022-03-30 PROCEDURE — 73030 X-RAY EXAM OF SHOULDER: CPT

## 2022-03-30 PROCEDURE — 99284 EMERGENCY DEPT VISIT MOD MDM: CPT

## 2022-03-30 PROCEDURE — 71045 X-RAY EXAM CHEST 1 VIEW: CPT | Mod: 26

## 2022-03-30 PROCEDURE — 73030 X-RAY EXAM OF SHOULDER: CPT | Mod: 26,RT

## 2022-03-30 PROCEDURE — 99284 EMERGENCY DEPT VISIT MOD MDM: CPT | Mod: 25

## 2022-03-30 PROCEDURE — 71045 X-RAY EXAM CHEST 1 VIEW: CPT

## 2022-03-30 NOTE — ED PROVIDER NOTE - CARE PROVIDERS DIRECT ADDRESSES
[email or web address]

## 2022-03-30 NOTE — ED ADULT TRIAGE NOTE - CHIEF COMPLAINT QUOTE
Patient BIBEMS from home for right shoulder pain. Patient is a poor historian but as per son he noticed that the patient's shoulder was swollen this morning. Patient is bed bound in a hospital bed at home, patient's son thinks that the patient's arm got stuck and that she tried to get her arm out of it. States the patient did not fall. Patient's right shoulder is tender to touch. Patient has no medical hx.

## 2022-03-30 NOTE — ED ADULT NURSE NOTE - NSICDXFAMILYHX_GEN_ALL_CORE_FT
Add 89004 Cpt? (Important Note: In 2017 The Use Of 82468 Is Being Tracked By Cms To Determine Future Global Period Reimbursement For Global Periods): yes Detail Level: Detailed Body Location Override (Optional - Billing Will Still Be Based On Selected Body Map Location If Applicable): right upper arm FAMILY HISTORY:  FH: hypertension

## 2022-03-30 NOTE — ED PROVIDER NOTE - OBJECTIVE STATEMENT
77 year old female with PMh dementia presents with shoulder pain. The brother at bedside who is POA states that they noticed swelling and pain to her R shoulder upon waking this am. Sx constant and worse with movement., pt has 24 hour care and could not have had unwitnessed fall as per POA. Of note, the pt often gets tangled in the railing of the hospital bed. Pt unable to to provide any hx given her dementia

## 2022-03-30 NOTE — ED PROVIDER NOTE - CARE PROVIDER_API CALL
Jorge L Noriega)  Orthopaedic Surgery  [address] [place] [address]  Phone: [phone]  Fax: [phone]  Follow Up Time:

## 2022-03-30 NOTE — ED PROVIDER NOTE - PATIENT PORTAL LINK FT
You can access the FollowMyHealth Patient Portal offered by [facility] by registering at the following website: [email or web address]. By joining [employer]’s FollowMyHealth portal, you will also be able to view your health information using other applications (apps) compatible with our system.

## 2022-03-30 NOTE — ED PROVIDER NOTE - CLINICAL SUMMARY MEDICAL DECISION MAKING FREE TEXT BOX
Pt at baseline mental status. neurovasc intact. no other sign of trauma. Now greater than 12 hours since injury even noticed with no change in mental status, and even if there were any head trauma the chance of clinically significant intracranial injury extremely clinically unlikely. POA states he would like to take the pt home. He feels safe as she has 24 hour care. I offered obs for pT and likely AR palcement and even advised that this would likely be in the pt's best interets but her declines

## 2022-04-06 ENCOUNTER — APPOINTMENT (OUTPATIENT)
Dept: ORTHOPEDIC SURGERY | Facility: CLINIC | Age: 77
End: 2022-04-06
Payer: MEDICARE

## 2022-04-06 VITALS — TEMPERATURE: 98.1 F | BODY MASS INDEX: 26.68 KG/M2 | WEIGHT: 170 LBS | HEIGHT: 67 IN

## 2022-04-06 DIAGNOSIS — M25.511 PAIN IN RIGHT SHOULDER: ICD-10-CM

## 2022-04-06 DIAGNOSIS — Z81.8 FAMILY HISTORY OF OTHER MENTAL AND BEHAVIORAL DISORDERS: ICD-10-CM

## 2022-04-06 DIAGNOSIS — Z82.49 FAMILY HISTORY OF ISCHEMIC HEART DISEASE AND OTHER DISEASES OF THE CIRCULATORY SYSTEM: ICD-10-CM

## 2022-04-06 DIAGNOSIS — Z86.59 PERSONAL HISTORY OF OTHER MENTAL AND BEHAVIORAL DISORDERS: ICD-10-CM

## 2022-04-06 DIAGNOSIS — S42.201A UNSPECIFIED FRACTURE OF UPPER END OF RIGHT HUMERUS, INITIAL ENCOUNTER FOR CLOSED FRACTURE: ICD-10-CM

## 2022-04-06 DIAGNOSIS — Z80.8 FAMILY HISTORY OF MALIGNANT NEOPLASM OF OTHER ORGANS OR SYSTEMS: ICD-10-CM

## 2022-04-06 DIAGNOSIS — Z83.49 FAMILY HISTORY OF OTHER ENDOCRINE, NUTRITIONAL AND METABOLIC DISEASES: ICD-10-CM

## 2022-04-06 DIAGNOSIS — Z82.3 FAMILY HISTORY OF STROKE: ICD-10-CM

## 2022-04-06 PROCEDURE — 23600 CLTX PROX HUMRL FX W/O MNPJ: CPT | Mod: RT

## 2022-04-06 PROCEDURE — 99213 OFFICE O/P EST LOW 20 MIN: CPT | Mod: 57

## 2022-04-06 NOTE — REVIEW OF SYSTEMS
[FreeTextEntry9] : Unable to express [FreeTextEntry2] : Unable to express [FreeTextEntry3] : Unable to express [FreeTextEntry4] : Unable to express [FreeTextEntry5] : Unable to express [FreeTextEntry6] : Unable to express [FreeTextEntry7] : Unable to express [FreeTextEntry8] : Unable to express [de-identified] : Unable to express [de-identified] : Unable to express [de-identified] : Unable to express [de-identified] : Unable to express [de-identified] : Unable to express

## 2022-04-06 NOTE — HISTORY OF PRESENT ILLNESS
[de-identified] : Patient 77-year-old female presents with her brother today for evaluation of right shoulder pain.  She was previously seen by us in the department for treatment of her femoral shaft fracture which was treated with intramedullary nailing.  Her brother works for the [employer].  She suffered an injury to the right shoulder and was seen in the ER recently where x-rays were done and a proximal humerus fracture was diagnosed.  She was subsequently referred to come see us.  She is unfortunately suffering from significant dementia and minimally able to participate in the exam today.  Her brother states she rarely uses her right upper extremity.  She does not have the ability to feed herself at the present time.  Pain exam is difficult to ascertain as patient cannot comply with questioning. [de-identified] : Unable to express [de-identified] : Unable to express [de-identified] : Unable to express

## 2022-04-06 NOTE — DISCUSSION/SUMMARY
[de-identified] : 77-year-old female with right proximal humerus fracture.  We discussed that these fractures frequently heal without operative intervention.  Research shows that operative and nonoperative management of fractures in this age group has about the same long-term outcomes.  The patient has significant osteopenia and likely osteoporosis and open reduction with internal fixation would likely be unsuccessful as such the options are either nonoperative or operative intervention.  I would recommend nonoperative intervention at this time.  The patient will be given a better fitting sling which they can wear for comfort.  Given her medical history and other comorbidities, as long as she is comfortable, she can wean out of the sling as tolerated.  I have no restrictions for her at the present time.  No further orthopedic trauma intervention is required but we will help facilitate future care as needed. The patient may follow up as needed.\par \par Jorge L Noriega MD\par Orthopaedic Trauma Surgeon\par [facility]\par [facility]\par Director Orthopaedic Trauma, [facility]\par \par \par \par

## 2022-04-06 NOTE — PHYSICAL EXAM
[de-identified] : Physical Exam:\par General: Well appearing, no acute distress\par Neurologic: Dementia, No focal deficits\par Head: NCAT without abrasions, lacerations, or ecchymosis to head, face, or scalp\par Respiratory: Equal chest wall expansion bilaterally, no accessory muscle use\par Lymphatic: No lymphadenopathy palpated\par Skin: Warm and dry\par Psychiatric: Dementia\par \par RUE:\par Physical exam limited secondary to patient dementia but pain noted with range of motion of right shoulder\par Brisk capillary refill\par 2+ radial pulse\par Unable to express sensory exam and unable to participate in motor exam [de-identified] : Plain films of the right proximal humerus were previously taken and reviewed today.  There is an impacted, comminuted proximal humerus fracture in the setting of diffuse osteopenia

## 2022-04-06 NOTE — REASON FOR VISIT
[Initial Visit] : an initial visit for [Family Member] : family member [FreeTextEntry2] : right shoulder pain

## 2024-01-01 NOTE — ED ADULT TRIAGE NOTE - CHIEF COMPLAINT QUOTE
ams per ems dispatch received a call from pt stating that she was being poisoned. pt arrives to er awake confused unable to obtain further info from pt due to confusion. ems states they spoke with pts son which stated pt has dementia and this is not new and the problem is she lives alone. A positive

## 2024-01-15 NOTE — PATIENT PROFILE ADULT - NSPROREFERSVCHOMEBH_GEN_A_NUR
Covid positive today, she is congested, sore throat yesterday was worse.   
Onset of symptoms yesterday and positive yesterday AM. Congestion, sore throat, headache. She states very mild symptoms feeling ok. Discussed paxlovid. Symptoms mild at this time and she feel she will support symptoms otc and call if needed. ICC/ER for worsening of symptoms.   
no

## 2024-05-25 ENCOUNTER — EMERGENCY (EMERGENCY)
Facility: HOSPITAL | Age: 79
LOS: 1 days | Discharge: HOME | End: 2024-05-25
Attending: STUDENT IN AN ORGANIZED HEALTH CARE EDUCATION/TRAINING PROGRAM
Payer: MEDICARE

## 2024-05-25 VITALS
HEART RATE: 60 BPM | SYSTOLIC BLOOD PRESSURE: 126 MMHG | HEIGHT: 65 IN | OXYGEN SATURATION: 98 % | TEMPERATURE: 97 F | RESPIRATION RATE: 16 BRPM | DIASTOLIC BLOOD PRESSURE: 77 MMHG | WEIGHT: 175.05 LBS

## 2024-05-25 VITALS
RESPIRATION RATE: 18 BRPM | SYSTOLIC BLOOD PRESSURE: 148 MMHG | OXYGEN SATURATION: 97 % | DIASTOLIC BLOOD PRESSURE: 85 MMHG | HEART RATE: 60 BPM

## 2024-05-25 LAB
ALBUMIN SERPL ELPH-MCNC: 3.6 G/DL — SIGNIFICANT CHANGE UP (ref 3.3–5.2)
ALP SERPL-CCNC: 89 U/L — SIGNIFICANT CHANGE UP (ref 40–120)
ALT FLD-CCNC: 14 U/L — SIGNIFICANT CHANGE UP
ANION GAP SERPL CALC-SCNC: 11 MMOL/L — SIGNIFICANT CHANGE UP (ref 5–17)
AST SERPL-CCNC: 41 U/L — HIGH
BASOPHILS # BLD AUTO: 0.05 K/UL — SIGNIFICANT CHANGE UP (ref 0–0.2)
BASOPHILS NFR BLD AUTO: 1 % — SIGNIFICANT CHANGE UP (ref 0–2)
BILIRUB SERPL-MCNC: 0.4 MG/DL — SIGNIFICANT CHANGE UP (ref 0.4–2)
BUN SERPL-MCNC: 9.8 MG/DL — SIGNIFICANT CHANGE UP (ref 8–20)
CALCIUM SERPL-MCNC: 9.7 MG/DL — SIGNIFICANT CHANGE UP (ref 8.4–10.5)
CHLORIDE SERPL-SCNC: 97 MMOL/L — SIGNIFICANT CHANGE UP (ref 96–108)
CO2 SERPL-SCNC: 23 MMOL/L — SIGNIFICANT CHANGE UP (ref 22–29)
CREAT SERPL-MCNC: 0.54 MG/DL — SIGNIFICANT CHANGE UP (ref 0.5–1.3)
EGFR: 95 ML/MIN/1.73M2 — SIGNIFICANT CHANGE UP
EOSINOPHIL # BLD AUTO: 0.31 K/UL — SIGNIFICANT CHANGE UP (ref 0–0.5)
EOSINOPHIL NFR BLD AUTO: 6.1 % — HIGH (ref 0–6)
GI PCR PANEL: SIGNIFICANT CHANGE UP
GLUCOSE SERPL-MCNC: 102 MG/DL — HIGH (ref 70–99)
HCT VFR BLD CALC: 40.4 % — SIGNIFICANT CHANGE UP (ref 34.5–45)
HGB BLD-MCNC: 13.6 G/DL — SIGNIFICANT CHANGE UP (ref 11.5–15.5)
IMM GRANULOCYTES NFR BLD AUTO: 0.2 % — SIGNIFICANT CHANGE UP (ref 0–0.9)
LACTATE BLDV-MCNC: 1.7 MMOL/L — SIGNIFICANT CHANGE UP (ref 0.5–2)
LIDOCAIN IGE QN: 72 U/L — HIGH (ref 22–51)
LYMPHOCYTES # BLD AUTO: 2.07 K/UL — SIGNIFICANT CHANGE UP (ref 1–3.3)
LYMPHOCYTES # BLD AUTO: 40.5 % — SIGNIFICANT CHANGE UP (ref 13–44)
MAGNESIUM SERPL-MCNC: 2.1 MG/DL — SIGNIFICANT CHANGE UP (ref 1.6–2.6)
MCHC RBC-ENTMCNC: 31.3 PG — SIGNIFICANT CHANGE UP (ref 27–34)
MCHC RBC-ENTMCNC: 33.7 GM/DL — SIGNIFICANT CHANGE UP (ref 32–36)
MCV RBC AUTO: 92.9 FL — SIGNIFICANT CHANGE UP (ref 80–100)
MONOCYTES # BLD AUTO: 0.47 K/UL — SIGNIFICANT CHANGE UP (ref 0–0.9)
MONOCYTES NFR BLD AUTO: 9.2 % — SIGNIFICANT CHANGE UP (ref 2–14)
NEUTROPHILS # BLD AUTO: 2.2 K/UL — SIGNIFICANT CHANGE UP (ref 1.8–7.4)
NEUTROPHILS NFR BLD AUTO: 43 % — SIGNIFICANT CHANGE UP (ref 43–77)
PLATELET # BLD AUTO: 256 K/UL — SIGNIFICANT CHANGE UP (ref 150–400)
POTASSIUM SERPL-MCNC: 5.3 MMOL/L — SIGNIFICANT CHANGE UP (ref 3.5–5.3)
POTASSIUM SERPL-SCNC: 5.3 MMOL/L — SIGNIFICANT CHANGE UP (ref 3.5–5.3)
PROT SERPL-MCNC: 6.9 G/DL — SIGNIFICANT CHANGE UP (ref 6.6–8.7)
RBC # BLD: 4.35 M/UL — SIGNIFICANT CHANGE UP (ref 3.8–5.2)
RBC # FLD: 13.4 % — SIGNIFICANT CHANGE UP (ref 10.3–14.5)
SODIUM SERPL-SCNC: 131 MMOL/L — LOW (ref 135–145)
WBC # BLD: 5.11 K/UL — SIGNIFICANT CHANGE UP (ref 3.8–10.5)
WBC # FLD AUTO: 5.11 K/UL — SIGNIFICANT CHANGE UP (ref 3.8–10.5)

## 2024-05-25 PROCEDURE — 74176 CT ABD & PELVIS W/O CONTRAST: CPT | Mod: MC

## 2024-05-25 PROCEDURE — 96360 HYDRATION IV INFUSION INIT: CPT

## 2024-05-25 PROCEDURE — 87045 FECES CULTURE AEROBIC BACT: CPT

## 2024-05-25 PROCEDURE — 99284 EMERGENCY DEPT VISIT MOD MDM: CPT | Mod: 25

## 2024-05-25 PROCEDURE — 36415 COLL VENOUS BLD VENIPUNCTURE: CPT

## 2024-05-25 PROCEDURE — 87046 STOOL CULTR AEROBIC BACT EA: CPT

## 2024-05-25 PROCEDURE — 85025 COMPLETE CBC W/AUTO DIFF WBC: CPT

## 2024-05-25 PROCEDURE — 83690 ASSAY OF LIPASE: CPT

## 2024-05-25 PROCEDURE — 83605 ASSAY OF LACTIC ACID: CPT

## 2024-05-25 PROCEDURE — 87507 IADNA-DNA/RNA PROBE TQ 12-25: CPT

## 2024-05-25 PROCEDURE — 99284 EMERGENCY DEPT VISIT MOD MDM: CPT

## 2024-05-25 PROCEDURE — 96361 HYDRATE IV INFUSION ADD-ON: CPT

## 2024-05-25 PROCEDURE — 87077 CULTURE AEROBIC IDENTIFY: CPT

## 2024-05-25 PROCEDURE — 83735 ASSAY OF MAGNESIUM: CPT

## 2024-05-25 PROCEDURE — 74176 CT ABD & PELVIS W/O CONTRAST: CPT | Mod: 26,MC

## 2024-05-25 PROCEDURE — 80053 COMPREHEN METABOLIC PANEL: CPT

## 2024-05-25 RX ORDER — SODIUM CHLORIDE 9 MG/ML
500 INJECTION INTRAMUSCULAR; INTRAVENOUS; SUBCUTANEOUS ONCE
Refills: 0 | Status: COMPLETED | OUTPATIENT
Start: 2024-05-25 | End: 2024-05-25

## 2024-05-25 RX ADMIN — SODIUM CHLORIDE 500 MILLILITER(S): 9 INJECTION INTRAMUSCULAR; INTRAVENOUS; SUBCUTANEOUS at 13:44

## 2024-05-25 RX ADMIN — SODIUM CHLORIDE 500 MILLILITER(S): 9 INJECTION INTRAMUSCULAR; INTRAVENOUS; SUBCUTANEOUS at 15:59

## 2024-05-25 NOTE — ED PROVIDER NOTE - NSTIMEPROVIDERCAREINITIATE_GEN_ER
Pt is calling with questions about getting an injection. Pt requesting a call back  
no ROM deficits were identified
25-May-2024 11:42

## 2024-05-25 NOTE — ED PROVIDER NOTE - PROGRESS NOTE DETAILS
Ultrasound IV infiltrated  Prior to receiving CT scan.  Will make another attempt at insertion of IV.  Discussed with healthcare proxy at bedside if that we cannot obtain IV will do the CT scan without contrast Galdino: Pt received in signout from Dr. Elmore. No acute findings on workup. Medically stable for discharge.

## 2024-05-25 NOTE — ED PROVIDER NOTE - OBJECTIVE STATEMENT
Documentation delayed secondary to patient care.  Patient is a 79-year-old female with past medical history of dementia, nonverbal at baseline presents accompanied by brother Warren who is her healthcare proxy for 1 month of nonbloody diarrhea.  Warren states that diarrhea is actually improving and has gone from watery to pasty and nature.  No fever, no recent hospitalizations no recent antibiotic use.  Appears to be at baseline.

## 2024-05-25 NOTE — ED PROVIDER NOTE - CLINICAL SUMMARY MEDICAL DECISION MAKING FREE TEXT BOX
hx and physical as noted above. nonblood diarrhea x 1 month, improving. labs to eval for dehydration - CT imaging given age and chronicity of symptoms to evaluate for intraabdominal pathology. Brother health care proxy showed me a sample of formed, pasty appearing green stool that was recently collected - no clinical suspicion for c-diff at this time based on history and exam. patient signed out to dr campbell pending CT

## 2024-05-25 NOTE — ED ADULT NURSE NOTE - NSFALLUNIVINTERV_ED_ALL_ED
Bed/Stretcher in lowest position, wheels locked, appropriate side rails in place/Call bell, personal items and telephone in reach/Instruct patient to call for assistance before getting out of bed/chair/stretcher/Non-slip footwear applied when patient is off stretcher/[place] to call system/Physically safe environment - no spills, clutter or unnecessary equipment/Purposeful proactive rounding/Room/bathroom lighting operational, light cord in reach

## 2024-05-25 NOTE — ED PROVIDER NOTE - PATIENT PORTAL LINK FT
You can access the FollowMyHealth Patient Portal offered by [facility] by registering at the following website: [email or web address]. By joining [facility]’s FollowMyHealth portal, you will also be able to view your health information using other applications (apps) compatible with our system.

## 2024-05-25 NOTE — ED PROVIDER NOTE - CARE PROVIDER_API CALL
Nicolas Verduzco  Gastroenterology  [address] [place] [address], [facility] [address]  [place] [address]  Phone: [phone]  Fax: [phone]  Follow Up Time:

## 2024-05-25 NOTE — ED PROVIDER NOTE - PHYSICAL EXAMINATION
PHYSICAL EXAM:   General: nontoxic appearing  HEENT: NC/AT, airway patent  Cardiovascular: regular rate and rhythm, + S1/S2, no murmurs, rubs, gallops appreciated  Respiratory: clear to auscultation bilaterally, good aeration bilaterally, nonlabored respirations  Abdominal: soft, nontender, nondistended, no rebound, guarding or rigidity  Back: no costovertebral tenderness, no rashes noted  Neuro: Alert and oriented x0.  does not follow commands (baseline)  Psychiatric: appropriate mood and affect.   -Corinne Elmore MD Attending Physician

## 2024-05-25 NOTE — ED PROVIDER NOTE - PROVIDER TOKENS
[FreeTextEntry1] : had concern may have had lyme\par lyme titer negative\par feels like he had bits
PROVIDER:[TOKEN:[[id number]]]

## 2024-05-25 NOTE — ED ADULT NURSE REASSESSMENT NOTE - NS ED NURSE REASSESS COMMENT FT1
Assumed care of pt from BINU thorne RN at 1930. Pt is resting comfortably in stretcher. NAD. Pt is nonverbal at baseline. Respirations are even and unlabored. Pt awaiting CT scan. Perineal care performed on pt. Pt repositioned and boosted in bed. Pt brother at bedside. Plan on going.

## 2024-05-25 NOTE — ED ADULT NURSE NOTE - CAS EDP DISCH TYPE
Bed: 13  Expected date:   Expected time:   Means of arrival: [facility]  Comments:  
Pt c/o pain radiating up neck. HEIKE Pearson, updated. Medication given as prescribed.  
Rm [id number] Call Britta @ [facility]  
Home

## 2024-05-29 LAB
CULTURE RESULTS: SIGNIFICANT CHANGE UP
SPECIMEN SOURCE: SIGNIFICANT CHANGE UP

## 2024-09-09 NOTE — ED PROVIDER NOTE - RESPIRATORY, MLM
Detail Level: Zone Sunscreen Recommendations: SPF 50 or above reapply every 1.5-2 hours Sunscreen Recommendations: SPF 50 re-apply every 1.5-2 hours Detail Level: Generalized Breath sounds clear and equal bilaterally.

## 2024-11-05 NOTE — ED ADULT NURSE NOTE - NS ED NURSE LEVEL OF CONSCIOUSNESS ORIENTATION
Nonverbal Procedure To Be Performed At Next Visit: Biopsy by shave method Detail Level: Detailed Size Of Lesion In Cm (Optional): 0 Introduction Text (Please End With A Colon): The following procedure was deferred: biopsy by shave

## 2025-01-27 NOTE — CONSULT NOTE ADULT - CONVERSATION DETAILS
Procedure: Rt radial Arterial line placement      Indication for procedure:  Indication: Frequent arterial blood gas monitoring, Intra-arterial pressure monitoring.           Consent / Time Out / Team   Time of procedure:   Informed consent:    Procedure team members:   Time out: patient, procedure, side, site, safety procedures followed.        Location of procedure   Location:      Preparation for procedure   Preparation: Rt radial Artery palpated and collateral circulation intact.     Sterile preparation of site: Dressed in usual fashion, with 70 % alcohol, with full drapes, gown, gloves and mask.     Postion: forearm extended & wrist dorsiflexed.        Technique   Technique: Location confirmed via flashback, Needle advanced, Angiocath advanced to the hilt, Pressure applied to proximal end of catheter.     Immediate assessment      Procedure tolerated: well.        Findings      Findings: Location confirmed transducer.        Complications   Complications: None.        Follow up intervention and notes      NOTES: Rt Radial arterial line inserted    Osiris Castro M.D.  Internal Medicine, PGY3    Writer spoke with pt's son. Reviewed patient's medical and social history as well as events leading to patient's hospitalization. Writer discussed patient's current diagnosis (advanced dementia, fracture s/p fall s/p repair), medical condition and management,  prognosis, and life expectancy. Son showed good insight into medical condition. Pt has had MOLST form from before stating DNR/DNI status (writer reinstated such order in the medical chart). Pt was previously placed on hospice in 2019 s/p CVA but then outlived the hospice period and was discharged from hospice by hospice team due to no further progression/decline. Given current decline and now with advanced dementia. Pt is a candidate for hospice. Son in agreement and asked for home hospice care upon discharge. Pt has 24/7 HHA over the past 4 years. All questions answered. Psychosocial support provided.

## 2025-05-07 NOTE — ED PROVIDER NOTE - NEUROLOGICAL SYMPTOMS OF STROKE
Chronic, severe, currently exacerbated. Patient is managed on Breztri + Duoneb + Albuterol prn and is oxygen dependent. Patient was seen in walk-in clinic and prescribed augmentin and patient is already on prednisone for her UC.     She reports thick green mucus and could benefit from Levaquin d/t risk of pseudomonas and the severity of her COPD.     Emphasized airway clearance w/ flutter valve and mucinex     Consider CT chest + sputum culture if symptoms are persistent and she doesn't respond to Levaquin.     Orders:    RSV Beyfortus 1 mL    DME Flutter Valve    Guaifenesin 1200 MG TABLET SR 12 HR; Take 1 Tablet by mouth 2 times a day as needed (congestion).    levoFLOXacin (LEVAQUIN) 750 MG tablet; Take 1 Tablet by mouth every day. Take until gone.     muscle weakness